# Patient Record
Sex: MALE | Race: WHITE | Employment: FULL TIME
[De-identification: names, ages, dates, MRNs, and addresses within clinical notes are randomized per-mention and may not be internally consistent; named-entity substitution may affect disease eponyms.]

---

## 2022-08-02 ENCOUNTER — NURSE TRIAGE (OUTPATIENT)
Dept: OTHER | Facility: CLINIC | Age: 42
End: 2022-08-02

## 2022-08-02 NOTE — TELEPHONE ENCOUNTER
Received call from [facility] with Red Flag Complaint. Subjective: Caller states \"nausea and vomiting , bloody stools in last month put has been seen for this a few months ago\"     Current Symptoms: nausea, vomiting, no appetite    Onset: 1 week ago; gradual, worsening    Associated Symptoms: reduced appetite, diarrhea    Pain Severity: 0/10; N/A;     Temperature: denies    What has been tried: denies    LMP: NA Pregnant: NA    Recommended disposition: See in Office Today or Tomorrow    Care advice provided, patient verbalizes understanding; denies any other questions or concerns; instructed to call back for any new or worsening symptoms. Patient/Caller agrees with recommended disposition; writer provided warm transfer to [facility] for appointment scheduling    Attention Provider: Thank you for allowing me to participate in the care of your patient. The patient was connected to triage in response to information provided to the [facility]. Please do not respond through this encounter as the response is not directed to a shared pool.       Reason for Disposition   Patient wants to be seen    Protocols used: Nausea-ADULT-OH

## 2022-08-03 ENCOUNTER — OFFICE VISIT (OUTPATIENT)
Dept: FAMILY MEDICINE CLINIC | Facility: CLINIC | Age: 42
End: 2022-08-03
Payer: COMMERCIAL

## 2022-08-03 VITALS
OXYGEN SATURATION: 98 % | WEIGHT: 233.8 LBS | DIASTOLIC BLOOD PRESSURE: 90 MMHG | TEMPERATURE: 98.7 F | RESPIRATION RATE: 18 BRPM | HEART RATE: 81 BPM | HEIGHT: 75 IN | SYSTOLIC BLOOD PRESSURE: 140 MMHG | BODY MASS INDEX: 29.07 KG/M2

## 2022-08-03 DIAGNOSIS — K58.0 IRRITABLE BOWEL SYNDROME WITH DIARRHEA: ICD-10-CM

## 2022-08-03 DIAGNOSIS — F10.10 ALCOHOL ABUSE, DAILY USE: ICD-10-CM

## 2022-08-03 DIAGNOSIS — R11.0 NAUSEA: ICD-10-CM

## 2022-08-03 DIAGNOSIS — I10 ESSENTIAL HYPERTENSION: Primary | ICD-10-CM

## 2022-08-03 DIAGNOSIS — E78.5 DYSLIPIDEMIA: ICD-10-CM

## 2022-08-03 LAB
AMYLASE SERPL-CCNC: 39 U/L (ref 25–115)
LIPASE SERPL-CCNC: 124 U/L (ref 73–393)

## 2022-08-03 PROCEDURE — 99204 OFFICE O/P NEW MOD 45 MIN: CPT | Performed by: PHYSICIAN ASSISTANT

## 2022-08-03 RX ORDER — DEXTROAMPHETAMINE SACCHARATE, AMPHETAMINE ASPARTATE MONOHYDRATE, DEXTROAMPHETAMINE SULFATE AND AMPHETAMINE SULFATE 2.5; 2.5; 2.5; 2.5 MG/1; MG/1; MG/1; MG/1
10 CAPSULE, EXTENDED RELEASE ORAL
COMMUNITY
Start: 2022-07-18

## 2022-08-03 RX ORDER — ATORVASTATIN CALCIUM 10 MG/1
10 TABLET, FILM COATED ORAL DAILY
COMMUNITY
End: 2022-08-03

## 2022-08-03 RX ORDER — LISINOPRIL 10 MG/1
10 TABLET ORAL DAILY
COMMUNITY
Start: 2022-07-08 | End: 2022-08-03 | Stop reason: SDUPTHER

## 2022-08-03 RX ORDER — DEXTROAMPHETAMINE SACCHARATE, AMPHETAMINE ASPARTATE, DEXTROAMPHETAMINE SULFATE AND AMPHETAMINE SULFATE 2.5; 2.5; 2.5; 2.5 MG/1; MG/1; MG/1; MG/1
10 TABLET ORAL
COMMUNITY
Start: 2022-07-18

## 2022-08-03 RX ORDER — LISINOPRIL 10 MG/1
10 TABLET ORAL DAILY
Qty: 90 TABLET | Refills: 3 | Status: SHIPPED | OUTPATIENT
Start: 2022-08-03

## 2022-08-03 RX ORDER — FAMOTIDINE 20 MG/1
TABLET, FILM COATED ORAL
Qty: 90 TABLET | Refills: 3 | Status: SHIPPED | OUTPATIENT
Start: 2022-08-03

## 2022-08-03 RX ORDER — ATORVASTATIN CALCIUM 10 MG/1
10 TABLET, FILM COATED ORAL DAILY
Qty: 90 TABLET | Refills: 3 | Status: SHIPPED | OUTPATIENT
Start: 2022-08-03

## 2022-08-03 RX ORDER — MELOXICAM 15 MG/1
15 TABLET ORAL DAILY
COMMUNITY
Start: 2022-07-18

## 2022-08-03 RX ORDER — ONDANSETRON 4 MG/1
4 TABLET, FILM COATED ORAL 3 TIMES DAILY PRN
Qty: 30 TABLET | Refills: 2 | Status: SHIPPED | OUTPATIENT
Start: 2022-08-03

## 2022-08-03 ASSESSMENT — ANXIETY QUESTIONNAIRES
1. FEELING NERVOUS, ANXIOUS, OR ON EDGE: 0
7. FEELING AFRAID AS IF SOMETHING AWFUL MIGHT HAPPEN: 1
IF YOU CHECKED OFF ANY PROBLEMS ON THIS QUESTIONNAIRE, HOW DIFFICULT HAVE THESE PROBLEMS MADE IT FOR YOU TO DO YOUR WORK, TAKE CARE OF THINGS AT HOME, OR GET ALONG WITH OTHER PEOPLE: NOT DIFFICULT AT ALL
3. WORRYING TOO MUCH ABOUT DIFFERENT THINGS: 0
6. BECOMING EASILY ANNOYED OR IRRITABLE: 0
2. NOT BEING ABLE TO STOP OR CONTROL WORRYING: 0
4. TROUBLE RELAXING: 0
GAD7 TOTAL SCORE: 1
5. BEING SO RESTLESS THAT IT IS HARD TO SIT STILL: 0

## 2022-08-03 ASSESSMENT — PATIENT HEALTH QUESTIONNAIRE - PHQ9
SUM OF ALL RESPONSES TO PHQ QUESTIONS 1-9: 0
2. FEELING DOWN, DEPRESSED OR HOPELESS: 0
1. LITTLE INTEREST OR PLEASURE IN DOING THINGS: 0
SUM OF ALL RESPONSES TO PHQ9 QUESTIONS 1 & 2: 0

## 2022-08-03 ASSESSMENT — ENCOUNTER SYMPTOMS
WHEEZING: 0
SHORTNESS OF BREATH: 0
EYE PAIN: 0
DIARRHEA: 1
ABDOMINAL PAIN: 0
NAUSEA: 1
CHEST TIGHTNESS: 0
SORE THROAT: 0

## 2022-08-03 NOTE — PROGRESS NOTES
Normal rate and regular rhythm. Heart sounds: Normal heart sounds. Pulmonary:      Effort: Pulmonary effort is normal.      Breath sounds: Normal breath sounds. Abdominal:      General: Abdomen is flat. There is no distension. Palpations: Abdomen is soft. There is no mass. Tenderness: There is no abdominal tenderness. There is no guarding. Musculoskeletal:      Cervical back: Normal range of motion. Skin:     General: Skin is warm and dry. Neurological:      General: No focal deficit present. Mental Status: He is alert. Psychiatric:         Mood and Affect: Mood normal.         Behavior: Behavior normal.         Thought Content: Thought content normal.         Judgment: Judgment normal.       PHQ-9  8/3/2022   Little interest or pleasure in doing things 0   Feeling down, depressed, or hopeless 0   PHQ-2 Score 0   PHQ-9 Total Score 0          DIAGNOSIS  Aureliano Braga was seen today for new patient and nausea. Diagnoses and all orders for this visit:    Essential hypertension  -     lisinopril (PRINIVIL;ZESTRIL) 10 MG tablet; Take 1 tablet by mouth in the morning. Dyslipidemia  -     atorvastatin (LIPITOR) 10 MG tablet; Take 1 tablet by mouth in the morning. Irritable bowel syndrome with diarrhea    Nausea  -     ondansetron (ZOFRAN) 4 MG tablet; Take 1 tablet by mouth 3 times daily as needed for Nausea or Vomiting  -     Amylase; Future  -     Lipase; Future  -     Lipase  -     Amylase    Alcohol abuse, daily use    Other orders  -     famotidine (PEPCID) 20 MG tablet; 1 po daily x 2 weeks, then daily prn thereafter       Has been on lisinopril 10mg for past 5 mos or so. BP at today's visit a little increased--likely due to white coat HTN. BP's outside of office have been wnl.  120's/80's. No change to med. He is also taking Atorvastatin 10 mg daily. Taking meloxicam and working on exercises for left plantar fasciitis. Starts PT tomorrow.       Prescribed Adderall by [facility] in [facility]. Will have him start Famotidine daily x 2 weeks, then take daily on a prn basis after that. I provided him w/ a handout on a Low FODMAP diet and would like him to try this to see if it helps w/ frequenty IBS w/ diarrhea complaints. Prescribed Zofran to take prn for nausea. Will send to lab to check Lipase and Amylase levels today. He is normally drinking 5-6 beers/day. Encouraged him to reduce this to no more than 3 daily. Assessment & Plan  Pt was encouraged to sign up/go to [employer] for access to information at a later time. Follow Up    Please have him sign a release for prior medical records from [facility]--need copies of medical records and labs from past 1 year.    Please send to lab today for bloodwork  Return 4-6 weeks for re-eval of GI complaints      Orders Placed This Encounter   Procedures    Amylase     Standing Status:   Future     Number of Occurrences:   1     Standing Expiration Date:   8/3/2023    Lipase     Standing Status:   Future     Number of Occurrences:   1     Standing Expiration Date:   8/3/2023            [address] [address]  Dept: [phone]  Dept Fax: [phone]

## 2022-09-06 NOTE — PROGRESS NOTES
Nito Capps (: 1980) is a 38 y.o. male, an established patient, is here for evaluation of the following chief complaint(s):  Chief Complaint   Patient presents with    Gastroesophageal Reflux          ASSESSMENT/PLAN:  Chuy Vazquez was seen today for gastroesophageal reflux. Diagnoses and all orders for this visit:    Needs flu shot  -     Influenza, FLUCELVAX, (age 10 mo+), IM, PF, 0.5 mL  -     CO IMMUNIZ ADMIN,1 SINGLE/COMB VAC/TOXOID    Other orders  -     Tdap (ADACEL) 5-2-15.5 LF-MCG/0.5 injection; Inject 0.5 mLs into the muscle once for 1 dose      GI symptoms improved. Now only taking Famotidine prn (rarely needs). He did not need to try the FODMAP diet. Prescribed Adderall by [facility] in [facility]. We have not received prior medical records. He signed a release for these at his last visit. Will resend. Will schedule a return visit for a CPE in 6 mos and if prior medical records are not received by then, we'll obtain labs at his visit. Follow Up    Return for 6 mos CPE (no labs). SUBJECTIVE/OBJECTIVE:  At his visit On 8/3/22, the following was discussed:     Has been on lisinopril 10mg for past 5 mos or so. BP at today's visit a little increased--likely due to white coat HTN. BP's outside of office have been wnl.  120's/80's. No change to med. Taking meloxicam and working on exercises for left plantar fasciitis. Starts PT tomorrow. Will have him start Famotidine daily x 2 weeks, then take daily on a prn basis after that. I provided him w/ a handout on a Low FODMAP diet and would like him to try this to see if it helps w/ frequenty IBS w/ diarrhea complaints. --Prescribed Zofran to take prn for nausea. He is normally drinking 5-6 beers/day. Encouraged him to reduce this to no more than 3 daily. At today's visit:     Follow-up GI issues. Patient stated that he is feeling a lot better.                   Vitals:    22 1418   BP: 110/80   Pulse: 92 Resp: 18   Temp: 98.8 °F (37.1 °C)   TempSrc: Oral   SpO2: 98%   Weight: 234 lb (106.1 kg)   Height: 6' 2.5\" (1.892 m)                    Orders Placed This Encounter    MI IMMUNIZ ADMIN,1 SINGLE/COMB VAC/TOXOID    Influenza, FLUCELVAX, (age 10 mo+), IM, PF, 0.5 mL    Tdap (ADACEL) 5-2-15.5 LF-MCG/0.5 injection     Sig: Inject 0.5 mLs into the muscle once for 1 dose     Dispense:  0.5 mL     Refill:  0             An electronic signature was used to authenticate this note.   -- SHANA Dillon

## 2022-09-07 ENCOUNTER — OFFICE VISIT (OUTPATIENT)
Dept: FAMILY MEDICINE CLINIC | Facility: CLINIC | Age: 42
End: 2022-09-07
Payer: COMMERCIAL

## 2022-09-07 VITALS
OXYGEN SATURATION: 98 % | DIASTOLIC BLOOD PRESSURE: 80 MMHG | WEIGHT: 234 LBS | SYSTOLIC BLOOD PRESSURE: 110 MMHG | HEART RATE: 92 BPM | RESPIRATION RATE: 18 BRPM | HEIGHT: 75 IN | BODY MASS INDEX: 29.09 KG/M2 | TEMPERATURE: 98.8 F

## 2022-09-07 DIAGNOSIS — Z23 NEEDS FLU SHOT: Primary | ICD-10-CM

## 2022-09-07 PROCEDURE — 90471 IMMUNIZATION ADMIN: CPT | Performed by: PHYSICIAN ASSISTANT

## 2022-09-07 PROCEDURE — 99213 OFFICE O/P EST LOW 20 MIN: CPT | Performed by: PHYSICIAN ASSISTANT

## 2022-09-07 PROCEDURE — 90674 CCIIV4 VAC NO PRSV 0.5 ML IM: CPT | Performed by: PHYSICIAN ASSISTANT

## 2022-09-15 ENCOUNTER — TELEMEDICINE (OUTPATIENT)
Dept: FAMILY MEDICINE CLINIC | Facility: CLINIC | Age: 42
End: 2022-09-15
Payer: COMMERCIAL

## 2022-09-15 DIAGNOSIS — U07.1 COVID: Primary | ICD-10-CM

## 2022-09-15 PROCEDURE — 99214 OFFICE O/P EST MOD 30 MIN: CPT | Performed by: PHYSICIAN ASSISTANT

## 2022-09-15 NOTE — PROGRESS NOTES
Leidy Estrada  (1980) is an established patient, here for evaluation of the following:    Assessment & Plan   Below is the assessment and plan developed based on review of pertinent history, physical exam, labs, studies, and medications. Jessica Guzman was seen today for positive for covid-19. Diagnoses and all orders for this visit:    COVID  -     nirmatrelvir/ritonavir (PAXLOVID) 20 x 150 MG & 10 x 100MG TBPK; Take 3 tablets (two 150 mg nirmatrelvir and one 100 mg ritonavir tablets) by mouth every 12 hours for 5 days. Pt has covid. Has a ha, cough, sore throat, stuffy nose. Vomited a couple of times. Off/on fever. He is a candidate for paxlovid based on suggested criteria. Will have him hold Atorvastatin while taking Paxlovid and delay initial dose until 12 hrs after last dose of Atorvastatin. Reviewed quarantine recommendations w/ him. Follow Up    Return for F/u as scheduled/as needed. Subjective     Covid symptoms started Tuesday (9/13/22) night. Positive home test Wednesday 9/14/22) morning            Patient-Reported Vitals  Patient-Reported Vitals 9/15/2022   Patient-Reported Weight 230lb   Patient-Reported Height 6f2.5i           Orders Placed This Encounter    nirmatrelvir/ritonavir (PAXLOVID) 20 x 150 MG & 10 x 100MG TBPK     Sig: Take 3 tablets (two 150 mg nirmatrelvir and one 100 mg ritonavir tablets) by mouth every 12 hours for 5 days. Dispense:  30 tablet     Refill:  0     Order Specific Question:   Does this patient qualify for COVID-19 antIviral therapy based on criteria for treatment? Answer: Yes        Leidy Estrada, was evaluated through a synchronous (real-time) audio-video encounter. The patient (or guardian if applicable) is aware that this is a billable service, which includes applicable co-pays. This Virtual Visit was conducted with patient's (and/or legal guardian's) consent.  The visit was conducted pursuant to the emergency declaration under the [facility] [address]383 waiver authority and the [employer] General Act. Patient identification was verified, and a caregiver was present when appropriate. The patient was located at Home: [address] [place] [address]. Provider was located at [facility] (Appt Dept): [facility],  [address] [place] [address].              --SHANA Nichols

## 2023-04-05 ENCOUNTER — OFFICE VISIT (OUTPATIENT)
Dept: FAMILY MEDICINE CLINIC | Facility: CLINIC | Age: 43
End: 2023-04-05
Payer: COMMERCIAL

## 2023-04-05 VITALS
HEIGHT: 75 IN | SYSTOLIC BLOOD PRESSURE: 120 MMHG | DIASTOLIC BLOOD PRESSURE: 78 MMHG | BODY MASS INDEX: 25.24 KG/M2 | WEIGHT: 203 LBS

## 2023-04-05 DIAGNOSIS — Z72.0 OCCASIONAL CIGARETTE SMOKER: ICD-10-CM

## 2023-04-05 DIAGNOSIS — I10 ESSENTIAL HYPERTENSION: ICD-10-CM

## 2023-04-05 DIAGNOSIS — Z11.59 NEED FOR HEPATITIS C SCREENING TEST: ICD-10-CM

## 2023-04-05 DIAGNOSIS — E78.5 DYSLIPIDEMIA: ICD-10-CM

## 2023-04-05 DIAGNOSIS — Z00.00 PHYSICAL EXAM, ANNUAL: Primary | ICD-10-CM

## 2023-04-05 PROBLEM — S92.032A CLOSED DISPLACED AVULSION FRACTURE OF TUBEROSITY OF LEFT CALCANEUS: Status: ACTIVE | Noted: 2023-01-03

## 2023-04-05 LAB
BASOPHILS # BLD: 0.1 K/UL (ref 0–0.2)
BASOPHILS NFR BLD: 1 % (ref 0–2)
DIFFERENTIAL METHOD BLD: ABNORMAL
EOSINOPHIL # BLD: 0.1 K/UL (ref 0–0.8)
EOSINOPHIL NFR BLD: 1 % (ref 0.5–7.8)
ERYTHROCYTE [DISTWIDTH] IN BLOOD BY AUTOMATED COUNT: 13.1 % (ref 11.9–14.6)
HCT VFR BLD AUTO: 42.3 % (ref 41.1–50.3)
HGB BLD-MCNC: 14.2 G/DL (ref 13.6–17.2)
IMM GRANULOCYTES # BLD AUTO: 0 K/UL (ref 0–0.5)
IMM GRANULOCYTES NFR BLD AUTO: 0 % (ref 0–5)
LYMPHOCYTES # BLD: 1.6 K/UL (ref 0.5–4.6)
LYMPHOCYTES NFR BLD: 14 % (ref 13–44)
MCH RBC QN AUTO: 29.6 PG (ref 26.1–32.9)
MCHC RBC AUTO-ENTMCNC: 33.6 G/DL (ref 31.4–35)
MCV RBC AUTO: 88.3 FL (ref 82–102)
MONOCYTES # BLD: 0.9 K/UL (ref 0.1–1.3)
MONOCYTES NFR BLD: 8 % (ref 4–12)
NEUTS SEG # BLD: 8.3 K/UL (ref 1.7–8.2)
NEUTS SEG NFR BLD: 76 % (ref 43–78)
NRBC # BLD: 0 K/UL (ref 0–0.2)
PLATELET # BLD AUTO: 367 K/UL (ref 150–450)
PMV BLD AUTO: 10 FL (ref 9.4–12.3)
RBC # BLD AUTO: 4.79 M/UL (ref 4.23–5.6)
WBC # BLD AUTO: 11 K/UL (ref 4.3–11.1)

## 2023-04-05 PROCEDURE — 99396 PREV VISIT EST AGE 40-64: CPT | Performed by: PHYSICIAN ASSISTANT

## 2023-04-05 PROCEDURE — 3074F SYST BP LT 130 MM HG: CPT | Performed by: PHYSICIAN ASSISTANT

## 2023-04-05 PROCEDURE — 90471 IMMUNIZATION ADMIN: CPT | Performed by: PHYSICIAN ASSISTANT

## 2023-04-05 PROCEDURE — 3078F DIAST BP <80 MM HG: CPT | Performed by: PHYSICIAN ASSISTANT

## 2023-04-05 PROCEDURE — 90677 PCV20 VACCINE IM: CPT | Performed by: PHYSICIAN ASSISTANT

## 2023-04-05 RX ORDER — ATORVASTATIN CALCIUM 10 MG/1
10 TABLET, FILM COATED ORAL DAILY
Qty: 90 TABLET | Refills: 3 | Status: SHIPPED | OUTPATIENT
Start: 2023-04-05

## 2023-04-05 RX ORDER — LISINOPRIL 10 MG/1
10 TABLET ORAL DAILY
Qty: 90 TABLET | Refills: 3 | Status: SHIPPED | OUTPATIENT
Start: 2023-04-05

## 2023-04-05 SDOH — ECONOMIC STABILITY: INCOME INSECURITY: HOW HARD IS IT FOR YOU TO PAY FOR THE VERY BASICS LIKE FOOD, HOUSING, MEDICAL CARE, AND HEATING?: NOT HARD AT ALL

## 2023-04-05 SDOH — ECONOMIC STABILITY: FOOD INSECURITY: WITHIN THE PAST 12 MONTHS, YOU WORRIED THAT YOUR FOOD WOULD RUN OUT BEFORE YOU GOT MONEY TO BUY MORE.: NEVER TRUE

## 2023-04-05 SDOH — ECONOMIC STABILITY: HOUSING INSECURITY
IN THE LAST 12 MONTHS, WAS THERE A TIME WHEN YOU DID NOT HAVE A STEADY PLACE TO SLEEP OR SLEPT IN A SHELTER (INCLUDING NOW)?: NO

## 2023-04-05 SDOH — ECONOMIC STABILITY: FOOD INSECURITY: WITHIN THE PAST 12 MONTHS, THE FOOD YOU BOUGHT JUST DIDN'T LAST AND YOU DIDN'T HAVE MONEY TO GET MORE.: NEVER TRUE

## 2023-04-05 ASSESSMENT — PATIENT HEALTH QUESTIONNAIRE - PHQ9
SUM OF ALL RESPONSES TO PHQ9 QUESTIONS 1 & 2: 0
2. FEELING DOWN, DEPRESSED OR HOPELESS: 0
SUM OF ALL RESPONSES TO PHQ QUESTIONS 1-9: 0
1. LITTLE INTEREST OR PLEASURE IN DOING THINGS: 0
SUM OF ALL RESPONSES TO PHQ QUESTIONS 1-9: 0

## 2023-04-05 ASSESSMENT — ENCOUNTER SYMPTOMS
CONSTIPATION: 0
EYE ITCHING: 0
ABDOMINAL PAIN: 0
EYE PAIN: 0
DIARRHEA: 0
COUGH: 0
CHEST TIGHTNESS: 0
WHEEZING: 0
RHINORRHEA: 0
EYE DISCHARGE: 0
SORE THROAT: 0

## 2023-04-05 NOTE — PROGRESS NOTES
Rahel Ybarra (: 1980  ) is a 38 y.o. male  established patient, here for evaluation of the following chief complaint(s):  Chief Complaint   Patient presents with    Annual Exam     CPE          ASSESSMENT/PLAN:  Karla Mendenhall was seen today for annual exam.    Diagnoses and all orders for this visit:    Physical exam, annual  -     Pneumococcal, PCV20, PREVNAR 20, (age 25 yrs+), IM, PF  -     CBC with Auto Differential; Future  -     Comprehensive Metabolic Panel; Future  -     Lipid Panel; Future  -     TSH; Future    Essential hypertension  -     lisinopril (PRINIVIL;ZESTRIL) 10 MG tablet; Take 1 tablet by mouth daily    Dyslipidemia  -     atorvastatin (LIPITOR) 10 MG tablet; Take 1 tablet by mouth daily    Occasional cigarette smoker  -     Pneumococcal, PCV20, PREVNAR 20, (age 25 yrs+), IM, PF    Need for hepatitis C screening test  -     Hepatitis C Antibody; Future    Other orders  -     Tdap (ADACEL) 5-2-15.5 LF-MCG/0.5 injection; Inject 0.5 mLs into the muscle once for 1 dose        Continues on listed meds without difficulty. BP today is: WNL. Currently prescribed:   Key Anti-Hypertensive Meds            lisinopril (PRINIVIL;ZESTRIL) 10 MG tablet    Sig - Route: Take 1 tablet by mouth in the morning. - Oral    Notes to Pharmacy: Please place rx on file until pt. Requests refill. Last colonoscopy was in , due  again ? ?--will request copy of report. He was hospitalized on 1/3/23 for a closed displaced fx of left calcaneus. PHQ screening is not concerning for depression. Prescribed Adderall by [facility] in [facility]. Anticipatory Guidance discussed for the following: Family Problems, Diet & exercise, Substance abuse (none), sexual practices, injury prevention and dental health. Follow Up    Please have him sign a release for prior medical records from [facility]--need copy of last colonoscopy report.    6 mos --HTN mgt w/ labs prior to

## 2023-04-05 NOTE — PATIENT INSTRUCTIONS
Thank you for enrolling in [address] Please follow the instructions below to securely access your online medical record. [employer] allows you to send messages to your doctor, view your test results, renew your prescriptions, schedule appointments, and more. How Do I Sign Up? In your Internet browser, go to [email or web address]  Click on the Sign Up Now link in the Sign In box. You will see the New Member Sign Up page. Enter your [employer] Access Code exactly as it appears below. You will not need to use this code after youve completed the sign-up process. If you do not sign up before the expiration date, you must request a new code. [employer] Access Code: [email or web address]  Expires: 5/20/2023  2:26 PM    Enter your Social Security Number ([id number]) and Date of Birth (mm/dd/yyyy) as indicated and click Submit. You will be taken to the next sign-up page. Create a [employer] ID. This will be your [employer] login ID and cannot be changed, so think of one that is secure and easy to remember. Create a [employer] password. You can change your password at any time. Enter your Password Reset Question and Answer. This can be used at a later time if you forget your password. Enter your e-mail address. You will receive e-mail notification when new information is available in [address] Click Sign Up. You can now view your medical record. Additional Information  If you have questions, please contact your physician practice where you receive care. Remember, [employer] is NOT to be used for urgent needs. For medical emergencies, dial 911.

## 2023-04-05 NOTE — LETTER
MG/DL    Total Bilirubin 0.5 0.2 - 1.1 MG/DL    ALT 33 12 - 65 U/L    AST 17 15 - 37 U/L    Alk Phosphatase 64 50 - 136 U/L    Total Protein 7.4 6.3 - 8.2 g/dL    Albumin 4.4 3.5 - 5.0 g/dL    Globulin 3.0 2.8 - 4.5 g/dL    Albumin/Globulin Ratio 1.5 0.4 - 1.6     CBC with Auto Differential   Result Value Ref Range    WBC 11.0 4.3 - 11.1 K/uL    RBC 4.79 4.23 - 5.6 M/uL    Hemoglobin 14.2 13.6 - 17.2 g/dL    Hematocrit 42.3 41.1 - 50.3 %    MCV 88.3 82 - 102 FL    MCH 29.6 26.1 - 32.9 PG    MCHC 33.6 31.4 - 35.0 g/dL    RDW 13.1 11.9 - 14.6 %    Platelets 475 574 - 596 K/uL    MPV 10.0 9.4 - 12.3 FL    nRBC 0.00 0.0 - 0.2 K/uL      Comment:      **Note: Absolute NRBC parameter is now reported with Hemogram**    Differential Type AUTOMATED      Seg Neutrophils 76 43 - 78 %    Lymphocytes 14 13 - 44 %    Monocytes 8 4.0 - 12.0 %    Eosinophils % 1 0.5 - 7.8 %    Basophils 1 0.0 - 2.0 %    Immature Granulocytes 0 0.0 - 5.0 %    Segs Absolute 8.3 (H) 1.7 - 8.2 K/UL    Absolute Lymph # 1.6 0.5 - 4.6 K/UL    Absolute Mono # 0.9 0.1 - 1.3 K/UL    Absolute Eos # 0.1 0.0 - 0.8 K/UL    Basophils Absolute 0.1 0.0 - 0.2 K/UL    Absolute Immature Granulocyte 0.0 0.0 - 0.5 K/UL       If you have any questions or concerns, please don't hesitate to call.     Sincerely,        SHANA Jacome

## 2023-04-06 LAB
ALBUMIN SERPL-MCNC: 4.4 G/DL (ref 3.5–5)
ALBUMIN/GLOB SERPL: 1.5 (ref 0.4–1.6)
ALP SERPL-CCNC: 64 U/L (ref 50–136)
ALT SERPL-CCNC: 33 U/L (ref 12–65)
ANION GAP SERPL CALC-SCNC: 9 MMOL/L (ref 2–11)
AST SERPL-CCNC: 17 U/L (ref 15–37)
BILIRUB SERPL-MCNC: 0.5 MG/DL (ref 0.2–1.1)
BUN SERPL-MCNC: 18 MG/DL (ref 6–23)
CALCIUM SERPL-MCNC: 9.6 MG/DL (ref 8.3–10.4)
CHLORIDE SERPL-SCNC: 104 MMOL/L (ref 101–110)
CHOLEST SERPL-MCNC: 129 MG/DL
CO2 SERPL-SCNC: 23 MMOL/L (ref 21–32)
CREAT SERPL-MCNC: 1.1 MG/DL (ref 0.8–1.5)
GLOBULIN SER CALC-MCNC: 3 G/DL (ref 2.8–4.5)
GLUCOSE SERPL-MCNC: 85 MG/DL (ref 65–100)
HDLC SERPL-MCNC: 56 MG/DL (ref 40–60)
HDLC SERPL: 2.3
LDLC SERPL CALC-MCNC: 54.2 MG/DL
POTASSIUM SERPL-SCNC: 3.9 MMOL/L (ref 3.5–5.1)
PROT SERPL-MCNC: 7.4 G/DL (ref 6.3–8.2)
SODIUM SERPL-SCNC: 136 MMOL/L (ref 133–143)
TRIGL SERPL-MCNC: 94 MG/DL (ref 35–150)
TSH, 3RD GENERATION: 2.29 UIU/ML (ref 0.36–3.74)
VLDLC SERPL CALC-MCNC: 18.8 MG/DL (ref 6–23)

## 2023-04-09 LAB — HCV AB SER QL: NONREACTIVE

## 2023-08-18 ENCOUNTER — TELEPHONE (OUTPATIENT)
Dept: FAMILY MEDICINE CLINIC | Facility: CLINIC | Age: 43
End: 2023-08-18

## 2023-08-18 DIAGNOSIS — N52.9 ERECTILE DYSFUNCTION, UNSPECIFIED ERECTILE DYSFUNCTION TYPE: Primary | ICD-10-CM

## 2023-08-18 NOTE — TELEPHONE ENCOUNTER
Pt verbalized that he needs a script for Viagra.     Pharmacy [facility] [address] [facility] [address]

## 2023-08-21 RX ORDER — SILDENAFIL 50 MG/1
50 TABLET, FILM COATED ORAL PRN
Qty: 10 TABLET | Refills: 1 | Status: SHIPPED | OUTPATIENT
Start: 2023-08-21

## 2023-08-21 NOTE — TELEPHONE ENCOUNTER
I don't see that we've ever prescribed this for him previously. Will go ahead and send in an rx for 10 tabs + 1 RF and will discuss further at next appt in October.

## 2023-09-05 DIAGNOSIS — E78.5 DYSLIPIDEMIA: ICD-10-CM

## 2023-09-05 DIAGNOSIS — I10 ESSENTIAL HYPERTENSION: Primary | ICD-10-CM

## 2023-10-04 PROBLEM — Z87.81 HISTORY OF FOOT FRACTURE: Status: ACTIVE | Noted: 2023-01-03

## 2023-10-05 DIAGNOSIS — I10 ESSENTIAL HYPERTENSION: ICD-10-CM

## 2023-10-06 LAB
ANION GAP SERPL CALC-SCNC: 7 MMOL/L (ref 2–11)
BUN SERPL-MCNC: 14 MG/DL (ref 6–23)
CALCIUM SERPL-MCNC: 9.6 MG/DL (ref 8.3–10.4)
CHLORIDE SERPL-SCNC: 106 MMOL/L (ref 101–110)
CO2 SERPL-SCNC: 27 MMOL/L (ref 21–32)
CREAT SERPL-MCNC: 1.2 MG/DL (ref 0.8–1.5)
GLUCOSE SERPL-MCNC: 94 MG/DL (ref 65–100)
POTASSIUM SERPL-SCNC: 3.7 MMOL/L (ref 3.5–5.1)
SODIUM SERPL-SCNC: 140 MMOL/L (ref 133–143)

## 2023-10-09 ENCOUNTER — TELEPHONE (OUTPATIENT)
Dept: FAMILY MEDICINE CLINIC | Facility: CLINIC | Age: 43
End: 2023-10-09

## 2023-10-09 NOTE — TELEPHONE ENCOUNTER
----- Message from Kim Mercer sent at 10/6/2023  4:19 PM EDT -----  Pt missed appt w/ me  on  10/5/23. Please reschedule. Will review lab results at visit.

## 2023-10-11 ENCOUNTER — TELEPHONE (OUTPATIENT)
Dept: FAMILY MEDICINE CLINIC | Facility: CLINIC | Age: 43
End: 2023-10-11

## 2023-10-11 ENCOUNTER — TELEMEDICINE (OUTPATIENT)
Dept: FAMILY MEDICINE CLINIC | Facility: CLINIC | Age: 43
End: 2023-10-11
Payer: COMMERCIAL

## 2023-10-11 DIAGNOSIS — N52.9 ERECTILE DYSFUNCTION, UNSPECIFIED ERECTILE DYSFUNCTION TYPE: ICD-10-CM

## 2023-10-11 DIAGNOSIS — E78.5 DYSLIPIDEMIA: ICD-10-CM

## 2023-10-11 DIAGNOSIS — I10 ESSENTIAL HYPERTENSION: Primary | ICD-10-CM

## 2023-10-11 PROCEDURE — 99214 OFFICE O/P EST MOD 30 MIN: CPT | Performed by: PHYSICIAN ASSISTANT

## 2023-10-11 RX ORDER — SILDENAFIL 50 MG/1
50 TABLET, FILM COATED ORAL PRN
Qty: 10 TABLET | Refills: 5 | Status: SHIPPED | OUTPATIENT
Start: 2023-10-11

## 2023-10-11 NOTE — PROGRESS NOTES
Arnoldo Patterson (: 1980) is a 37 y.o. male, an established patient, is here for evaluation of the following chief complaint(s):  Chief Complaint   Patient presents with    Hypertension          ASSESSMENT/PLAN:  Hanna Orozco was seen today for hypertension. Diagnoses and all orders for this visit:    Essential hypertension    Dyslipidemia    Erectile dysfunction, unspecified erectile dysfunction type  -     sildenafil (VIAGRA) 50 MG tablet; Take 1 tablet by mouth as needed for Erectile Dysfunction    Other orders  -     Tdap (ADACEL) 5-2-15.5 LF-MCG/0.5 injection; Inject 0.5 mLs into the muscle once for 1 dose        BP today is: elevated. Currently prescribed:    lisinopril (PRINIVIL;ZESTRIL) 10 MG tablet    Sig - Route: Take 1 tablet by mouth daily - Oral    Notes to Pharmacy: Please place rx on file until pt. Requests refill. No HA, CP or blurry vision. Pt reports that he's had a lot of caffeine today. Will d/c atorvastatin and recheck lipids w/ next visit. Refilled Viagra for ED. He wishes to postpone colonoscopy screening--needs to coordinate w/ his mother. Follow Up    No follow-ups on file. SUBJECTIVE/OBJECTIVE:  HPI    At his visit On 23, the following was discussed:     Last colonoscopy was in , due  again ? ?--will request copy of report. At today's visit:     He has no new concerns.            10/11/2023     1:01 PM   Patient-Reported Vitals   Patient-Reported Weight 203lb   Patient-Reported Height 6f2.5i   Patient-Reported Systolic 562 mmHg   Patient-Reported Diastolic 86 mmHg          Orders Placed This Encounter    Tdap (ADACEL) 5-2-15.5 LF-MCG/0.5 injection     Sig: Inject 0.5 mLs into the muscle once for 1 dose     Dispense:  0.5 mL     Refill:  0    sildenafil (VIAGRA) 50 MG tablet     Sig: Take 1 tablet by mouth as needed for Erectile Dysfunction     Dispense:  10 tablet     Refill:  [address], was evaluated through a synchronous (real-time)

## 2023-10-11 NOTE — TELEPHONE ENCOUNTER
----- Message from Kim Marin sent at 10/6/2023  4:19 PM EDT -----  Pt missed appt w/ me  on  10/5/23. Please reschedule. Will review lab results at visit.

## 2023-10-24 DIAGNOSIS — N52.9 ERECTILE DYSFUNCTION, UNSPECIFIED ERECTILE DYSFUNCTION TYPE: ICD-10-CM

## 2023-10-24 RX ORDER — SILDENAFIL 50 MG/1
50 TABLET, FILM COATED ORAL PRN
Qty: 10 TABLET | Refills: 5 | Status: CANCELLED | OUTPATIENT
Start: 2023-10-24

## 2023-10-24 NOTE — TELEPHONE ENCOUNTER
PT IS REQUESTING A REFILL FOR SILDENAFIL 50 MG    PH[facility]Y [facility] [place], [facility]  [address] [facility]

## 2023-10-24 NOTE — TELEPHONE ENCOUNTER
Rx was sent to [facility] on 10/11/23 for 10 tablets (1mo supply) + 5 RF (for a total of 6 mos)

## 2024-02-28 DIAGNOSIS — E78.5 DYSLIPIDEMIA: ICD-10-CM

## 2024-02-28 DIAGNOSIS — I10 ESSENTIAL HYPERTENSION: Primary | ICD-10-CM

## 2024-03-29 ENCOUNTER — NURSE ONLY (OUTPATIENT)
Dept: FAMILY MEDICINE CLINIC | Facility: CLINIC | Age: 44
End: 2024-03-29

## 2024-03-29 DIAGNOSIS — E78.5 DYSLIPIDEMIA: ICD-10-CM

## 2024-03-29 DIAGNOSIS — I10 ESSENTIAL HYPERTENSION: ICD-10-CM

## 2024-03-29 LAB
ALBUMIN SERPL-MCNC: 4 G/DL (ref 3.5–5)
ALBUMIN/GLOB SERPL: 1.2 (ref 0.4–1.6)
ALP SERPL-CCNC: 48 U/L (ref 50–136)
ALT SERPL-CCNC: 31 U/L (ref 12–65)
ANION GAP SERPL CALC-SCNC: 5 MMOL/L (ref 2–11)
AST SERPL-CCNC: 20 U/L (ref 15–37)
BILIRUB SERPL-MCNC: 0.5 MG/DL (ref 0.2–1.1)
BUN SERPL-MCNC: 11 MG/DL (ref 6–23)
CALCIUM SERPL-MCNC: 9.4 MG/DL (ref 8.3–10.4)
CHLORIDE SERPL-SCNC: 111 MMOL/L (ref 103–113)
CHOLEST SERPL-MCNC: 153 MG/DL
CO2 SERPL-SCNC: 24 MMOL/L (ref 21–32)
CREAT SERPL-MCNC: 1.3 MG/DL (ref 0.8–1.5)
GLOBULIN SER CALC-MCNC: 3.3 G/DL (ref 2.8–4.5)
GLUCOSE SERPL-MCNC: 103 MG/DL (ref 65–100)
HDLC SERPL-MCNC: 51 MG/DL (ref 40–60)
HDLC SERPL: 3
LDLC SERPL CALC-MCNC: 80.8 MG/DL
POTASSIUM SERPL-SCNC: 4 MMOL/L (ref 3.5–5.1)
PROT SERPL-MCNC: 7.3 G/DL (ref 6.3–8.2)
SODIUM SERPL-SCNC: 140 MMOL/L (ref 136–146)
TRIGL SERPL-MCNC: 106 MG/DL (ref 35–150)
VLDLC SERPL CALC-MCNC: 21.2 MG/DL (ref 6–23)

## 2024-04-10 NOTE — PROGRESS NOTES
SHANA Moise     Part of this note was written by using a voice dictation software. The note has been proof read but may still contain some grammatical/other typographical errors.

## 2024-04-11 ENCOUNTER — OFFICE VISIT (OUTPATIENT)
Dept: FAMILY MEDICINE CLINIC | Facility: CLINIC | Age: 44
End: 2024-04-11
Payer: COMMERCIAL

## 2024-04-11 VITALS
BODY MASS INDEX: 25.99 KG/M2 | SYSTOLIC BLOOD PRESSURE: 104 MMHG | OXYGEN SATURATION: 98 % | TEMPERATURE: 98.2 F | WEIGHT: 209 LBS | DIASTOLIC BLOOD PRESSURE: 68 MMHG | HEIGHT: 75 IN | RESPIRATION RATE: 16 BRPM | HEART RATE: 65 BPM

## 2024-04-11 DIAGNOSIS — Z12.11 SCREENING FOR COLORECTAL CANCER: ICD-10-CM

## 2024-04-11 DIAGNOSIS — I10 PRIMARY HYPERTENSION: Primary | ICD-10-CM

## 2024-04-11 DIAGNOSIS — Z12.12 SCREENING FOR COLORECTAL CANCER: ICD-10-CM

## 2024-04-11 DIAGNOSIS — Z00.00 BLOOD TESTS FOR ROUTINE GENERAL PHYSICAL EXAMINATION: ICD-10-CM

## 2024-04-11 DIAGNOSIS — I10 ESSENTIAL HYPERTENSION: ICD-10-CM

## 2024-04-11 DIAGNOSIS — N52.9 ERECTILE DYSFUNCTION, UNSPECIFIED ERECTILE DYSFUNCTION TYPE: ICD-10-CM

## 2024-04-11 DIAGNOSIS — E78.5 DYSLIPIDEMIA: ICD-10-CM

## 2024-04-11 PROBLEM — Z86.79 HISTORY OF HYPERTENSION: Status: ACTIVE | Noted: 2022-08-03

## 2024-04-11 PROBLEM — Z86.39 HISTORY OF HYPERLIPIDEMIA: Status: ACTIVE | Noted: 2022-08-03

## 2024-04-11 PROCEDURE — 99214 OFFICE O/P EST MOD 30 MIN: CPT | Performed by: PHYSICIAN ASSISTANT

## 2024-04-11 PROCEDURE — 3078F DIAST BP <80 MM HG: CPT | Performed by: PHYSICIAN ASSISTANT

## 2024-04-11 PROCEDURE — 3074F SYST BP LT 130 MM HG: CPT | Performed by: PHYSICIAN ASSISTANT

## 2024-04-11 RX ORDER — LISINOPRIL 10 MG/1
10 TABLET ORAL DAILY
Qty: 90 TABLET | Refills: 3 | OUTPATIENT
Start: 2024-04-11

## 2024-04-11 RX ORDER — SILDENAFIL 50 MG/1
50 TABLET, FILM COATED ORAL PRN
Qty: 30 TABLET | Refills: 3 | Status: SHIPPED | OUTPATIENT
Start: 2024-04-11

## 2024-04-11 RX ORDER — AMPHETAMINE 6.3 MG/1
TABLET, ORALLY DISINTEGRATING ORAL
COMMUNITY

## 2024-04-11 SDOH — ECONOMIC STABILITY: FOOD INSECURITY: WITHIN THE PAST 12 MONTHS, THE FOOD YOU BOUGHT JUST DIDN'T LAST AND YOU DIDN'T HAVE MONEY TO GET MORE.: NEVER TRUE

## 2024-04-11 SDOH — ECONOMIC STABILITY: FOOD INSECURITY: WITHIN THE PAST 12 MONTHS, YOU WORRIED THAT YOUR FOOD WOULD RUN OUT BEFORE YOU GOT MONEY TO BUY MORE.: NEVER TRUE

## 2024-04-11 SDOH — ECONOMIC STABILITY: INCOME INSECURITY: HOW HARD IS IT FOR YOU TO PAY FOR THE VERY BASICS LIKE FOOD, HOUSING, MEDICAL CARE, AND HEATING?: NOT HARD AT ALL

## 2024-04-11 ASSESSMENT — PATIENT HEALTH QUESTIONNAIRE - PHQ9
SUM OF ALL RESPONSES TO PHQ QUESTIONS 1-9: 0
SUM OF ALL RESPONSES TO PHQ QUESTIONS 1-9: 0
1. LITTLE INTEREST OR PLEASURE IN DOING THINGS: NOT AT ALL
SUM OF ALL RESPONSES TO PHQ QUESTIONS 1-9: 0
SUM OF ALL RESPONSES TO PHQ QUESTIONS 1-9: 0
SUM OF ALL RESPONSES TO PHQ9 QUESTIONS 1 & 2: 0
2. FEELING DOWN, DEPRESSED OR HOPELESS: NOT AT ALL

## 2024-04-11 NOTE — TELEPHONE ENCOUNTER
[facility] requesting Lisinopril 10 mg. Last filled on 4/5/23 with 90 and 3 refills.      Patient is  a SHANA Sparrow patient who requests that all of her patient's call for refills.

## 2024-06-25 DIAGNOSIS — N52.9 ERECTILE DYSFUNCTION, UNSPECIFIED ERECTILE DYSFUNCTION TYPE: ICD-10-CM

## 2024-06-26 RX ORDER — SILDENAFIL 50 MG/1
50 TABLET, FILM COATED ORAL PRN
Qty: 10 TABLET | Refills: 5 | OUTPATIENT
Start: 2024-06-26

## 2024-07-26 ENCOUNTER — TELEPHONE (OUTPATIENT)
Age: 44
End: 2024-07-26

## 2024-07-26 ENCOUNTER — PREP FOR PROCEDURE (OUTPATIENT)
Age: 44
End: 2024-07-26

## 2024-07-26 DIAGNOSIS — Z12.11 ENCOUNTER FOR SCREENING COLONOSCOPY: ICD-10-CM

## 2024-07-26 NOTE — TELEPHONE ENCOUNTER
Called and spoke with the patient to schedule a direct colonoscopy, scheduled 8/28/2024 with Dr Brandt at [facility]. Prep instructions mailed to the patient's home address.     GASTROENTEROLOGY  MIRALAX/GATORADE PREP          You are scheduled for a colonoscopy with Dr. Brandt on 8/28/2024    Items to purchase at pharmacy 5 days before your procedure:  Dulcolax tablets-- (LAXATIVE ONLY- not stool softener)  238-gram bottle of MiraLAX  64 ounces Gatorade, Crystal Light, Apple juice (NO RED BLUE OR PURPLE IN COLOR)    The day before your procedure:  Clear liquids only the entire day (water, coffee, tea, Sprite, 7-up, Jell-O, popsicles, Chicken/beef broth, apple juice, Gatorade) NO MILK, CREAMER, OR DAIRY PRODUCTS. NO SOLID FOODS. NOTHING RED, BLUE, OR PURPLE IN COLOR.  Mix Gatorade and MiraLAX together, shake the solution until the MiraLAX is dissolved. Chill before drinking.   Start at 5:00 pm Start drinking MiraLAX solution. Drink 8-ounce glass every 30 minutes. Until half gone. Put remainder in refrigerator.  At 5:00 pm take 2x Dulcolax tablets with 16 oz of water    NO MORE LIQUIDS AFTER MIDNIGHT (other than 2nd part of MiraLAX solution)    The day of your procedure:     6 HOURS PRIOR TO PROCEDURE: Drink the other half of your MiraLAX solution until gone.     Once you finish your prep, no more liquids until after procedure.     IMPORTANT:     If you do not follow these instructions, your colonoscopy could be canceled due to having an unclean prep.     If you have problems with your prep, call [phone]    IMPORTANT: IF YOU DO NOT FOLLOW THESE DIRECTIONS, YOUR COLONOSCOPY COULD BE CANCELED DUE TO HAVING AN UNCLEAR PREP**    GETTING READY FOR YOUR COLONOSCOPY  You will need to arrive at:       [facility]  .[address] [place] [id number] [phone]        You will need to  your bowel prep from your local pharmacy.    You must arrange for someone 18 years or older to come with you

## 2024-07-30 RX ORDER — SODIUM CHLORIDE 9 MG/ML
25 INJECTION, SOLUTION INTRAVENOUS PRN
Status: CANCELLED | OUTPATIENT
Start: 2024-07-30

## 2024-07-30 RX ORDER — SODIUM CHLORIDE 0.9 % (FLUSH) 0.9 %
5-40 SYRINGE (ML) INJECTION PRN
Status: CANCELLED | OUTPATIENT
Start: 2024-07-30

## 2024-07-30 RX ORDER — SODIUM CHLORIDE 0.9 % (FLUSH) 0.9 %
5-40 SYRINGE (ML) INJECTION EVERY 12 HOURS SCHEDULED
Status: CANCELLED | OUTPATIENT
Start: 2024-07-30

## 2024-08-25 PROBLEM — Z12.11 ENCOUNTER FOR SCREENING COLONOSCOPY: Status: RESOLVED | Noted: 2024-07-26 | Resolved: 2024-08-25

## 2024-08-28 NOTE — PROGRESS NOTES
Patient verified name, , and procedure.    Type: 1A; abbreviated assessment per anesthesia guidelines.    Labs per anesthesia: none.    Instructed pt that they will be notified the day before their procedure by the GI Lab for time of arrival if their procedure is [place] and Pre-op for [place] cases. Arrival times should be called by 5 pm. If no phone is received the patient should contact their respective hospital. The GI lab telephone number is [phone] and ES Pre-op is [phone].     Follow diet and prep instructions per office, including NPO status.      Bath or shower the night before and the am of surgery with non-moisturizing soap. No lotions, oils, powders, perfumes, or cologne on skin. No make up, eye make up or jewelry. Wear loose fitting comfortable, clean clothing.     Must have adult present in building the entire time .       Patient instructed to hold all vitamins/supplements 7 days prior to surgery and NSAIDS 5 days prior to surgery. Verbalized understanding.     The following discharge instructions reviewed with patient: medication given during procedure may cause drowsiness for several hours, therefore, do not drive or operate machinery for remainder of the day. You may not drink alcohol on the day of your procedure, please resume regular diet and activity unless otherwise directed. You may experience abdominal distention for several hours that is relieved by the passage of gas. Contact your physician if you have any of the following: fever or chills, severe abdominal pain or excessive amount of bleeding or a large amount when having a bowel movement. Occasional specks of blood with bowel movement would not be unusual.    2% lidocaine/with EPI

## 2024-09-05 ENCOUNTER — ANESTHESIA EVENT (OUTPATIENT)
Dept: ENDOSCOPY | Age: 44
End: 2024-09-05
Payer: COMMERCIAL

## 2024-09-05 RX ORDER — NALOXONE HYDROCHLORIDE 0.4 MG/ML
INJECTION, SOLUTION INTRAMUSCULAR; INTRAVENOUS; SUBCUTANEOUS PRN
Status: CANCELLED | OUTPATIENT
Start: 2024-09-05

## 2024-09-05 RX ORDER — IPRATROPIUM BROMIDE AND ALBUTEROL SULFATE 2.5; .5 MG/3ML; MG/3ML
1 SOLUTION RESPIRATORY (INHALATION)
Status: CANCELLED | OUTPATIENT
Start: 2024-09-05 | End: 2024-09-06

## 2024-09-05 RX ORDER — ONDANSETRON 2 MG/ML
4 INJECTION INTRAMUSCULAR; INTRAVENOUS
Status: CANCELLED | OUTPATIENT
Start: 2024-09-05 | End: 2024-09-06

## 2024-09-05 NOTE — PROGRESS NOTES
Spoke with patient. Confirmed arrival time of 0730 for their 0859 procedure. Discussed NPO status after midnight. Reviewed  policy and advised patient to register in the lobby prior to coming to the GI lab. Patient verbalized understanding.

## 2024-09-06 ENCOUNTER — HOSPITAL ENCOUNTER (OUTPATIENT)
Age: 44
Setting detail: OUTPATIENT SURGERY
Discharge: HOME | End: 2024-09-06
Attending: STUDENT IN AN ORGANIZED HEALTH CARE EDUCATION/TRAINING PROGRAM | Admitting: STUDENT IN AN ORGANIZED HEALTH CARE EDUCATION/TRAINING PROGRAM
Payer: COMMERCIAL

## 2024-09-06 ENCOUNTER — ANESTHESIA (OUTPATIENT)
Dept: ENDOSCOPY | Age: 44
End: 2024-09-06
Payer: COMMERCIAL

## 2024-09-06 VITALS
BODY MASS INDEX: 26.73 KG/M2 | OXYGEN SATURATION: 100 % | DIASTOLIC BLOOD PRESSURE: 82 MMHG | WEIGHT: 215 LBS | HEART RATE: 78 BPM | HEIGHT: 75 IN | TEMPERATURE: 98.6 F | RESPIRATION RATE: 15 BRPM | SYSTOLIC BLOOD PRESSURE: 126 MMHG

## 2024-09-06 PROCEDURE — 7100000011 HC PHASE II RECOVERY - ADDTL 15 MIN: Performed by: STUDENT IN AN ORGANIZED HEALTH CARE EDUCATION/TRAINING PROGRAM

## 2024-09-06 PROCEDURE — 88305 TISSUE EXAM BY PATHOLOGIST: CPT

## 2024-09-06 PROCEDURE — 3609010600 HC COLONOSCOPY POLYPECTOMY SNARE/COLD BIOPSY: Performed by: STUDENT IN AN ORGANIZED HEALTH CARE EDUCATION/TRAINING PROGRAM

## 2024-09-06 PROCEDURE — 2500000003 HC RX 250 WO HCPCS: Performed by: NURSE ANESTHETIST, CERTIFIED REGISTERED

## 2024-09-06 PROCEDURE — 3700000000 HC ANESTHESIA ATTENDED CARE: Performed by: STUDENT IN AN ORGANIZED HEALTH CARE EDUCATION/TRAINING PROGRAM

## 2024-09-06 PROCEDURE — 7100000010 HC PHASE II RECOVERY - FIRST 15 MIN: Performed by: STUDENT IN AN ORGANIZED HEALTH CARE EDUCATION/TRAINING PROGRAM

## 2024-09-06 PROCEDURE — 3700000001 HC ADD 15 MINUTES (ANESTHESIA): Performed by: STUDENT IN AN ORGANIZED HEALTH CARE EDUCATION/TRAINING PROGRAM

## 2024-09-06 PROCEDURE — 6360000002 HC RX W HCPCS: Performed by: NURSE ANESTHETIST, CERTIFIED REGISTERED

## 2024-09-06 PROCEDURE — 2580000003 HC RX 258: Performed by: SURGERY

## 2024-09-06 PROCEDURE — 2709999900 HC NON-CHARGEABLE SUPPLY: Performed by: STUDENT IN AN ORGANIZED HEALTH CARE EDUCATION/TRAINING PROGRAM

## 2024-09-06 RX ORDER — SODIUM CHLORIDE 9 MG/ML
25 INJECTION, SOLUTION INTRAVENOUS PRN
Status: DISCONTINUED | OUTPATIENT
Start: 2024-09-06 | End: 2024-09-06 | Stop reason: HOSPADM

## 2024-09-06 RX ORDER — LIDOCAINE HYDROCHLORIDE 20 MG/ML
INJECTION, SOLUTION EPIDURAL; INFILTRATION; INTRACAUDAL; PERINEURAL PRN
Status: DISCONTINUED | OUTPATIENT
Start: 2024-09-06 | End: 2024-09-06 | Stop reason: SDUPTHER

## 2024-09-06 RX ORDER — SODIUM CHLORIDE 0.9 % (FLUSH) 0.9 %
5-40 SYRINGE (ML) INJECTION PRN
Status: DISCONTINUED | OUTPATIENT
Start: 2024-09-06 | End: 2024-09-06 | Stop reason: HOSPADM

## 2024-09-06 RX ORDER — LIDOCAINE HYDROCHLORIDE 10 MG/ML
1 INJECTION, SOLUTION INFILTRATION; PERINEURAL
Status: DISCONTINUED | OUTPATIENT
Start: 2024-09-06 | End: 2024-09-06 | Stop reason: HOSPADM

## 2024-09-06 RX ORDER — SODIUM CHLORIDE 9 MG/ML
INJECTION, SOLUTION INTRAVENOUS PRN
Status: DISCONTINUED | OUTPATIENT
Start: 2024-09-06 | End: 2024-09-06 | Stop reason: HOSPADM

## 2024-09-06 RX ORDER — SODIUM CHLORIDE 0.9 % (FLUSH) 0.9 %
5-40 SYRINGE (ML) INJECTION EVERY 12 HOURS SCHEDULED
Status: DISCONTINUED | OUTPATIENT
Start: 2024-09-06 | End: 2024-09-06 | Stop reason: HOSPADM

## 2024-09-06 RX ORDER — PROPOFOL 10 MG/ML
INJECTION, EMULSION INTRAVENOUS PRN
Status: DISCONTINUED | OUTPATIENT
Start: 2024-09-06 | End: 2024-09-06 | Stop reason: SDUPTHER

## 2024-09-06 RX ORDER — SODIUM CHLORIDE, SODIUM LACTATE, POTASSIUM CHLORIDE, CALCIUM CHLORIDE 600; 310; 30; 20 MG/100ML; MG/100ML; MG/100ML; MG/100ML
INJECTION, SOLUTION INTRAVENOUS CONTINUOUS
Status: DISCONTINUED | OUTPATIENT
Start: 2024-09-06 | End: 2024-09-06 | Stop reason: HOSPADM

## 2024-09-06 RX ADMIN — PROPOFOL 40 MG: 10 INJECTION, EMULSION INTRAVENOUS at 09:12

## 2024-09-06 RX ADMIN — PROPOFOL 300 MCG/KG/MIN: 10 INJECTION, EMULSION INTRAVENOUS at 09:11

## 2024-09-06 RX ADMIN — LIDOCAINE HYDROCHLORIDE 40 MG: 20 INJECTION, SOLUTION EPIDURAL; INFILTRATION; INTRACAUDAL; PERINEURAL at 09:10

## 2024-09-06 RX ADMIN — SODIUM CHLORIDE, POTASSIUM CHLORIDE, SODIUM LACTATE AND CALCIUM CHLORIDE: 600; 310; 30; 20 INJECTION, SOLUTION INTRAVENOUS at 07:53

## 2024-09-06 RX ADMIN — PROPOFOL 60 MG: 10 INJECTION, EMULSION INTRAVENOUS at 09:10

## 2024-09-06 ASSESSMENT — LIFESTYLE VARIABLES: SMOKING_STATUS: 1

## 2024-09-06 ASSESSMENT — PAIN - FUNCTIONAL ASSESSMENT: PAIN_FUNCTIONAL_ASSESSMENT: NONE - DENIES PAIN

## 2024-09-06 NOTE — ANESTHESIA POSTPROCEDURE EVALUATION
Department of Anesthesiology  Postprocedure Note    Patient: Samuel Hernandez  MRN: [id number]  YOB: 1980  Date of evaluation: 9/6/2024    Procedure Summary       Date: 09/06/24 Room / Location: [facility] ENDO  / [facility] ENDOSCOPY    Anesthesia Start: 0906 Anesthesia Stop: 0926    Procedure: COLONOSCOPY POLYPECTOMY SNARE/BIOPSY (Lower GI Region) Diagnosis:       Encounter for screening colonoscopy      (Encounter for screening colonoscopy [Z12.11])    Surgeons: Adrián Brandt MD Responsible Provider: All Siddiqui MD    Anesthesia Type: TIVA ASA Status: 2            Anesthesia Type: TIVA    Damien Phase I: Damien Score: 10    Damien Phase II: Damien Score: 10    Anesthesia Post Evaluation    Patient location during evaluation: PACU  Patient participation: complete - patient participated  Level of consciousness: awake and alert  Airway patency: patent  Nausea & Vomiting: no nausea and no vomiting  Cardiovascular status: hemodynamically stable  Respiratory status: acceptable, nonlabored ventilation and spontaneous ventilation  Hydration status: euvolemic  Comments: /82   Pulse 78   Temp 98.6 °F (37 °C) (Tympanic)   Resp 15   Ht 1.905 m (6' 3\")   Wt 97.5 kg (215 lb)   SpO2 100%   BMI 26.87 kg/m²     Multimodal analgesia pain management approach  Pain management: adequate and satisfactory to patient    No notable events documented.

## 2024-09-06 NOTE — DISCHARGE INSTRUCTIONS
Gastrointestinal Colonoscopy/Flexible Sigmoidoscopy - Lower Exam Discharge Instructions  Call Dr. Brandt at  for any problems or questions.  Contact the doctor’s office for follow up appointment as directed  Medication may cause drowsiness for several hours, therefore, do not drive or operate machinery for remainder of the day.  No alcohol today.  Do not make any important decisions such signing legal paperwork.  Ordinarily, you may resume regular diet and activity after exam unless otherwise specified by your physician.  Because of air put into your colon during exam, you may experience some abdominal distension, relieved by the passage of gas, for several hours.  Contact your physician if you have any of the following:  Excessive amount of bleeding - large amount when having a bowel movement.  Occasional specks of blood with bowel movement would not be unusual.  Severe abdominal pain  Fever or Chills  Polyp Removal - follow these additional instructions  Take Metamucil - 1 tablespoon in juice every morning for 3 days, if needed.  No Aspirin, Advil, Aleve, Nuprin, Ibuprofen, or medications that contain these drugs for 2 weeks, unless taken for a heart condition.    Any additional instructions:   ***        Instructions given to Samuel Hernandez and other family members.

## 2024-09-06 NOTE — OP NOTE
Operative Report    Patient: Samuel Hernandez MRN: [id number]      YOB: 1980  Age: 44 y.o.  Sex: male            Indications:  Screening    Preoperative Evaluation: The patient was evaluated prior to the procedure in the GI lab admission area, the patient ASA was recorded .  Consent was obtained from the patient with the risk of perforation bleeding and aspiration.    Anesthesia: MARTIR-per anesthesia    Complications: None; patient tolerated the procedure well.    EBL -insignificant      Procedure: The patient was sedated in the left lateral decubitus position.  Scope was advanced from the rectum to the cecum.  Per gastroenterology society guideline recommendations, right side of the colon was examined twice. The scope was withdrawn to the rectum, retroflexed view was performed.  The rectal exam was normal.  Preparation was adequate. Remsen score of 9 .    Findings:   1 polyp in the sigmoid colon, 3 mm in size, removed via jumbo biopsy forceps.  Otherwise unremarkable colon.  Internal hemorrhoids    Postoperative Diagnosis:   1 polyp  Hemorrhoids    Recommendations:   Repeat colonoscopy in 5 years.  Await for biopsy results, you will receive a pathology letter within 2 weeks.     Signed By:  Adrián Brandt MD     September 6, 2024

## 2024-09-06 NOTE — H&P
Samuel Hernandez is 44 y.o. y/o male here for screening colonoscopy.    No immediate (parents/siblings) FH of colon cancer, no acute symptoms.     History reviewed. No pertinent past medical history.  History reviewed. No pertinent surgical history.  Family History   Problem Relation Age of Onset    Hypertension Mother     Macular Degen Mother     No Known Problems Father      Social History     Tobacco Use    Smoking status: Former     Types: Cigarettes    Smokeless tobacco: Never   Vaping Use    Vaping status: Every Day    Substances: Nicotine    Devices: Disposable   Substance Use Topics    Alcohol use: Never    Drug use: Not Currently     Comment: CBD     No Known Allergies  Current Outpatient Medications   Medication Instructions    ADZENYS XR-ODT 6.3 MG TBED TAKE 1 TABLET IN THE MORNING ORALLY ONCE A DAY 30 DAYS    amphetamine-dextroamphetamine (ADDERALL XR) 10 MG extended release capsule 10 mg    amphetamine-dextroamphetamine (ADDERALL) 10 MG tablet 10 mg, Oral    sildenafil (VIAGRA) 50 mg, Oral, PRN     Review of Systems    ROS:    A complete 11 system ROS was performed and was negative aside from the pertinent negative and positives noted above.     PE:   Vitals:    09/06/24 0754   BP: 130/81   Pulse: 91   Resp: 16   Temp: 97.7 °F (36.5 °C)   SpO2: 97%      General:  The patient appears well-nourished, and is in no acute distress.    Skin:  no rash, ulcers. No Bleeding or signs of infection.  HEENT:  Normocephalic, atraumatic. No sclerae icterus.   Neck:  No pain on palpation or mobilization of the neck.  Respiratory: Respiratory effort is normal. Expansion maintained bilaterally and symmetrically. Normal breath sounds and clear to auscultation bilaterally without wheezes, rales, or rhonchi.    Cardiovascular:  Regular rate and rhythm.     Abdomen:  Soft, non tender to palpation. No distention. Normoactive bowel sounds present.    Extremities: No edema bilaterally. No erythema  Neurologic:  Alert and  oriented x3.  No sensory deficits. No asterixis  Psychiatric: Appropriate mood and affect.  Musculoskeletal: Strength and tone are symmetrical and maintained.    Adrián Brandt MD  [facility]

## 2024-09-09 ENCOUNTER — TELEPHONE (OUTPATIENT)
Dept: GASTROENTEROLOGY | Age: 44
End: 2024-09-09

## 2025-01-03 ENCOUNTER — TELEPHONE (OUTPATIENT)
Dept: FAMILY MEDICINE CLINIC | Facility: CLINIC | Age: 45
End: 2025-01-03

## 2025-01-03 DIAGNOSIS — Z30.09 VASECTOMY EVALUATION: Primary | ICD-10-CM

## 2025-01-03 NOTE — TELEPHONE ENCOUNTER
Patient called and wants to know if he needs a referral to a urologist for him to have a vasectomy or does he need to come into the office. Please confirm.

## 2025-01-09 ENCOUNTER — OFFICE VISIT (OUTPATIENT)
Dept: FAMILY MEDICINE CLINIC | Facility: CLINIC | Age: 45
End: 2025-01-09
Payer: COMMERCIAL

## 2025-01-09 VITALS
HEART RATE: 80 BPM | BODY MASS INDEX: 25.86 KG/M2 | WEIGHT: 208 LBS | TEMPERATURE: 97.7 F | SYSTOLIC BLOOD PRESSURE: 136 MMHG | OXYGEN SATURATION: 97 % | HEIGHT: 75 IN | DIASTOLIC BLOOD PRESSURE: 102 MMHG

## 2025-01-09 DIAGNOSIS — I10 ESSENTIAL HYPERTENSION: ICD-10-CM

## 2025-01-09 DIAGNOSIS — M54.31 SCIATICA OF RIGHT SIDE: Primary | ICD-10-CM

## 2025-01-09 PROCEDURE — 99214 OFFICE O/P EST MOD 30 MIN: CPT | Performed by: FAMILY MEDICINE

## 2025-01-09 PROCEDURE — 3080F DIAST BP >= 90 MM HG: CPT | Performed by: FAMILY MEDICINE

## 2025-01-09 PROCEDURE — 3075F SYST BP GE 130 - 139MM HG: CPT | Performed by: FAMILY MEDICINE

## 2025-01-09 RX ORDER — MELOXICAM 15 MG/1
15 TABLET ORAL DAILY
Qty: 30 TABLET | Refills: 0 | Status: SHIPPED | OUTPATIENT
Start: 2025-01-09

## 2025-01-09 RX ORDER — PREDNISONE 20 MG/1
20 TABLET ORAL 2 TIMES DAILY
COMMUNITY
Start: 2025-01-08

## 2025-01-09 RX ORDER — METHOCARBAMOL 750 MG/1
750 TABLET, FILM COATED ORAL 3 TIMES DAILY
COMMUNITY
Start: 2025-01-07

## 2025-01-09 SDOH — ECONOMIC STABILITY: FOOD INSECURITY: WITHIN THE PAST 12 MONTHS, YOU WORRIED THAT YOUR FOOD WOULD RUN OUT BEFORE YOU GOT MONEY TO BUY MORE.: NEVER TRUE

## 2025-01-09 SDOH — ECONOMIC STABILITY: FOOD INSECURITY: WITHIN THE PAST 12 MONTHS, THE FOOD YOU BOUGHT JUST DIDN'T LAST AND YOU DIDN'T HAVE MONEY TO GET MORE.: NEVER TRUE

## 2025-01-09 ASSESSMENT — PATIENT HEALTH QUESTIONNAIRE - PHQ9
2. FEELING DOWN, DEPRESSED OR HOPELESS: NOT AT ALL
SUM OF ALL RESPONSES TO PHQ QUESTIONS 1-9: 0
SUM OF ALL RESPONSES TO PHQ QUESTIONS 1-9: 0
1. LITTLE INTEREST OR PLEASURE IN DOING THINGS: NOT AT ALL
SUM OF ALL RESPONSES TO PHQ QUESTIONS 1-9: 0
SUM OF ALL RESPONSES TO PHQ9 QUESTIONS 1 & 2: 0
SUM OF ALL RESPONSES TO PHQ QUESTIONS 1-9: 0

## 2025-01-09 ASSESSMENT — ENCOUNTER SYMPTOMS: BACK PAIN: 1

## 2025-01-09 NOTE — PROGRESS NOTES
Samuel Hernandez (: 1980) is a 44 y.o. male, established patient, here for evaluation of the following chief complaint(s):  Back Pain (Saturday night sneezed while in bed and pt had back pain afdterward/)       ASSESSMENT/PLAN:  1. Sciatica of right side  -     XR LUMBAR SPINE (2-3 VIEWS); Future    Patient is a 44-year-old male with new onset lumbar back pain, which rating down his right leg, had similar symptoms about a month ago, but symptoms recurred when he sat up in bed and sneezed roughly 4 to 5 days ago, was seen in urgent care 2 days ago and given prednisone course and Robaxin, symptoms are not improving, patient is on day 2 of prednisone, encourage patient to stay the course, will also prescribe meloxicam to use as needed daily for pain, do not combine with ibuprofen or other NSAIDs,  Ordered x-ray to evaluate his lumbar spine if symptoms do not improve in the next 1 to 2 weeks.  Patient agreeable to plan,  Patient to let us know if symptoms have not improved in the next few weeks and to go for x-ray  SUBJECTIVE/OBJECTIVE:  Back Pain      See above    Physical Exam  Constitutional:       General: He is not in acute distress.     Appearance: He is not ill-appearing, toxic-appearing or diaphoretic.   HENT:      Head: Normocephalic and atraumatic.      Right Ear: External ear normal.      Left Ear: External ear normal.      Nose: Nose normal. No congestion or rhinorrhea.      Mouth/Throat:      Mouth: Mucous membranes are moist.   Eyes:      Extraocular Movements: Extraocular movements intact.      Pupils: Pupils are equal, round, and reactive to light.   Cardiovascular:      Rate and Rhythm: Normal rate.      Pulses: Normal pulses.   Pulmonary:      Effort: Pulmonary effort is normal. No respiratory distress.   Abdominal:      General: Abdomen is flat.      Palpations: Abdomen is soft.   Musculoskeletal:      Cervical back: Normal range of motion and neck supple. No rigidity.   Neurological:

## 2025-01-13 ENCOUNTER — HOSPITAL ENCOUNTER (OUTPATIENT)
Dept: GENERAL RADIOLOGY | Age: 45
Discharge: HOME | End: 2025-01-16
Payer: COMMERCIAL

## 2025-01-13 DIAGNOSIS — M54.31 SCIATICA OF RIGHT SIDE: ICD-10-CM

## 2025-01-13 PROCEDURE — 72100 X-RAY EXAM L-S SPINE 2/3 VWS: CPT

## 2025-01-13 NOTE — RESULT ENCOUNTER NOTE
Please call patient and see if he is feeling better, let him know his x-ray was overall okay, showed some mild changes in the degenerative disc, and some arthritis, but if his symptoms are no better, we can order an MRI.

## 2025-01-14 ENCOUNTER — TELEPHONE (OUTPATIENT)
Dept: FAMILY MEDICINE CLINIC | Facility: CLINIC | Age: 45
End: 2025-01-14

## 2025-01-14 ENCOUNTER — PATIENT MESSAGE (OUTPATIENT)
Dept: FAMILY MEDICINE CLINIC | Facility: CLINIC | Age: 45
End: 2025-01-14

## 2025-01-14 DIAGNOSIS — M54.16 LUMBAR RADICULOPATHY: Primary | ICD-10-CM

## 2025-01-14 NOTE — TELEPHONE ENCOUNTER
Patient called and stated he was in last week with back pain and it isn't any better. Patient had an xray yesterday and wants to know if he needs to come in to get the report or can someone call him with the results.   Patient's number is [phone]  Patient's  [birth year]

## 2025-01-17 ENCOUNTER — TELEPHONE (OUTPATIENT)
Dept: FAMILY MEDICINE CLINIC | Facility: CLINIC | Age: 45
End: 2025-01-17

## 2025-01-17 DIAGNOSIS — M54.31 SCIATICA OF RIGHT SIDE: Primary | ICD-10-CM

## 2025-01-24 NOTE — TELEPHONE ENCOUNTER
Let him know the mri was ordered as we had previously discussed/informed him.    Call  to schedule radiology tests

## 2025-01-27 ENCOUNTER — HOSPITAL ENCOUNTER (OUTPATIENT)
Dept: MRI IMAGING | Age: 45
Discharge: HOME | End: 2025-01-30
Attending: FAMILY MEDICINE
Payer: COMMERCIAL

## 2025-01-27 DIAGNOSIS — M54.31 SCIATICA OF RIGHT SIDE: ICD-10-CM

## 2025-01-27 PROCEDURE — 72148 MRI LUMBAR SPINE W/O DYE: CPT

## 2025-01-29 DIAGNOSIS — M54.9 BACK PAIN, UNSPECIFIED BACK LOCATION, UNSPECIFIED BACK PAIN LATERALITY, UNSPECIFIED CHRONICITY: Primary | ICD-10-CM

## 2025-01-30 ENCOUNTER — OFFICE VISIT (OUTPATIENT)
Dept: NEUROSURGERY | Age: 45
End: 2025-01-30
Payer: COMMERCIAL

## 2025-01-30 ENCOUNTER — HOSPITAL ENCOUNTER (OUTPATIENT)
Dept: GENERAL RADIOLOGY | Age: 45
Discharge: HOME | End: 2025-01-30
Payer: COMMERCIAL

## 2025-01-30 VITALS
TEMPERATURE: 98.8 F | HEIGHT: 75 IN | SYSTOLIC BLOOD PRESSURE: 119 MMHG | WEIGHT: 208 LBS | OXYGEN SATURATION: 98 % | DIASTOLIC BLOOD PRESSURE: 87 MMHG | HEART RATE: 93 BPM | BODY MASS INDEX: 25.86 KG/M2

## 2025-01-30 DIAGNOSIS — M54.9 BACK PAIN, UNSPECIFIED BACK LOCATION, UNSPECIFIED BACK PAIN LATERALITY, UNSPECIFIED CHRONICITY: ICD-10-CM

## 2025-01-30 DIAGNOSIS — M47.9 SPONDYLOSIS: ICD-10-CM

## 2025-01-30 DIAGNOSIS — Z86.39 HISTORY OF HYPERLIPIDEMIA: ICD-10-CM

## 2025-01-30 DIAGNOSIS — Z86.79 HISTORY OF HYPERTENSION: ICD-10-CM

## 2025-01-30 DIAGNOSIS — M54.16 LUMBAR RADICULOPATHY: Primary | ICD-10-CM

## 2025-01-30 DIAGNOSIS — Z87.81 HISTORY OF FOOT FRACTURE: ICD-10-CM

## 2025-01-30 PROCEDURE — 99204 OFFICE O/P NEW MOD 45 MIN: CPT | Performed by: STUDENT IN AN ORGANIZED HEALTH CARE EDUCATION/TRAINING PROGRAM

## 2025-01-30 PROCEDURE — 72120 X-RAY BEND ONLY L-S SPINE: CPT

## 2025-01-30 RX ORDER — GABAPENTIN 100 MG/1
300 CAPSULE ORAL 3 TIMES DAILY
Qty: 90 CAPSULE | Refills: 5 | Status: SHIPPED | OUTPATIENT
Start: 2025-01-30 | End: 2025-07-29

## 2025-01-30 NOTE — PROGRESS NOTES
[facility]      Neurosurgery History and Physical Clinic Note         Patient Name: Samuel Hernandez    Medical Record Number: [id number]    YOB: 1980    Date of Visit: 02/01/25         Visit Type: New Patient         CHIEF COMPLAINT:    Chief Complaint   Patient presents with    New Patient            HISTORY OF PRESENT ILLNESS:    I had the pleasure of meeting Samuel Hernandez in the neurosurgical clinic today. Samuel Hernandez is a pleasant 44 y.o. year young male who presented with complaints of lower back pain and right leg pain.  Patient reported since the beginning of this year he has been having pain, burning sensation from his lower back into his right foot.  Pain radiates down his posterior lateral thigh and down to the top of his foot.  Patient was given some prednisone, meloxicam and gabapentin.  So far he feels the gabapentin helped the most with his pain.  The pain is aggravated with walking, standing, sitting, lying down, and activities in general.  He has tried some hot and cold application.  Patient denies any weakness or any signs of foot drop.  Patient denies any bowel or bladder incontinence.,  No saddle anesthesia.        Red Flag Signs Acute  (Days) Subacute  (Weeks) Chronic  (Months) Absent   Incontinence  []  []  []  [x]   Severe Weakness  []  []  []  [x]   Severe Sensory Loss  []  []  []  [x]   Gait Disturbance  []  []  []  [x]     Blood Thinner: None      Medical History    History reviewed. No pertinent past medical history.         Surgical History    Past Surgical History:   Procedure Laterality Date    COLONOSCOPY N/A 9/6/2024    COLONOSCOPY POLYPECTOMY SNARE/BIOPSY performed by Adrián Brandt MD at [facility] ENDOSCOPY            Current Medications    Current Outpatient Medications   Medication Sig Dispense Refill    gabapentin (NEURONTIN) 100 MG capsule Take 3 capsules by mouth 3 times daily for 180 days. Intended supply: 30 days 90

## 2025-02-05 RX ORDER — MELOXICAM 15 MG/1
15 TABLET ORAL DAILY
Qty: 30 TABLET | Refills: 0 | OUTPATIENT
Start: 2025-02-05

## 2025-02-12 ENCOUNTER — HOSPITAL ENCOUNTER (OUTPATIENT)
Dept: PHYSICAL THERAPY | Age: 45
Setting detail: RECURRING SERIES
Discharge: HOME | End: 2025-02-15
Attending: STUDENT IN AN ORGANIZED HEALTH CARE EDUCATION/TRAINING PROGRAM
Payer: COMMERCIAL

## 2025-02-12 DIAGNOSIS — M54.59 OTHER LOW BACK PAIN: ICD-10-CM

## 2025-02-12 DIAGNOSIS — M62.81 MUSCLE WEAKNESS (GENERALIZED): Primary | ICD-10-CM

## 2025-02-12 PROCEDURE — 97161 PT EVAL LOW COMPLEX 20 MIN: CPT

## 2025-02-12 PROCEDURE — 97110 THERAPEUTIC EXERCISES: CPT

## 2025-02-12 NOTE — THERAPY EVALUATION
with ADL and occupational task performance.          Outcome Measure:   Modified Oswestry Low Back Pain Questionnaire:  Score:  Initial: 20/50    Interpretation of Score: Each section is scored on a 0-5 scale, 5 representing the greatest disability.  The scores of each section are added together for a total score of 50.      MEDICAL NECESSITY:  Skilled intervention continues to be required due to above deficits affecting participation in basic ADLs and overall functional tolerance.     REASON FOR SERVICES/ OTHER COMMENTS:  Patient continues to require skilled intervention due to impairments affecting functional mobility.    Total Duration:  Time In: 0800  Time Out: 0901     Regarding Samuel Hernandez's therapy, I certify that the treatment plan above will be carried out by a therapist or under their direction.    Thank you for this referral,     Cathy Alberts, PT, DPT     Referring Physician Signature: Nikita Cuellar,*          Post Session Pain  Charge Capture  PT Visit Info  MD Guidelines  MyChart  Events  Attendance Report

## 2025-02-12 NOTE — PROGRESS NOTES
Samuel Hernandez  : 1980  Primary: [employer] [facility] [employer] ([facility])  Secondary:  [facility] [place] [address]  [place] [address]  Phone: [phone]  Fax: [phone] Plan Frequency: 1x/week until end POC    Plan of Care/Certification Expiration Date: 25            Plan of Care/Certification Expiration Date:  Plan of Care/Certification Expiration Date: 25    Frequency/Duration:   Plan Frequency: 1x/week until end POC      Time In/Out:   Time In: 0800  Time Out: 0901      PT Visit Info:    Plan Frequency: 1x/week until end POC  Total # of Visits to Date: 1  Progress Note Counter: 1      Visit Count:  1    OUTPATIENT PHYSICAL THERAPY:   Treatment Note 2025       Charge Capture        Episode  (lumbar radiculopathy into RLE)               Treatment Diagnosis:    Other low back pain  Muscle weakness (generalized)    Medical/Referring Diagnosis:   History of hypertension  History of hyperlipidemia  Radiculopathy, lumbar region  Spondylosis, unspecified   Referring Physician: Nikita Cuellar MD MD Orders: PT Eval and Treat   Return MD Appt:  25 with Dr. Cuellar   Date of Onset: Onset Date: 25    Allergies: Patient has no known allergies.    Restrictions/Precautions: None    Interventions Planned (Treatment may consist of any combination of the following): See Assessment Note      Subjective Comments: See Evaluation Note from today.    Initial Pain Level:      (1-2)/10   Post Session Pain Level:        (1-2)/10     Medications Last Reviewed: 2025    Updated Objective Findings: See Evaluation Note from today    Treatment     THERAPEUTIC ACTIVITY: (5 minutes): Therapeutic activities per grid below to improve mobility, strength and balance. Required visual, verbal and tactile cues to promote static and dynamic balance in sitting/standing, as well as coordination of bilateral extremities.     PT evaluation, education on findings,

## 2025-02-17 ENCOUNTER — HOSPITAL ENCOUNTER (OUTPATIENT)
Dept: PHYSICAL THERAPY | Age: 45
Setting detail: RECURRING SERIES
End: 2025-02-17
Attending: STUDENT IN AN ORGANIZED HEALTH CARE EDUCATION/TRAINING PROGRAM
Payer: COMMERCIAL

## 2025-02-18 ENCOUNTER — HOSPITAL ENCOUNTER (OUTPATIENT)
Dept: PHYSICAL THERAPY | Age: 45
Setting detail: RECURRING SERIES
Discharge: HOME | End: 2025-02-21
Attending: STUDENT IN AN ORGANIZED HEALTH CARE EDUCATION/TRAINING PROGRAM
Payer: COMMERCIAL

## 2025-02-18 PROCEDURE — 97110 THERAPEUTIC EXERCISES: CPT

## 2025-02-18 NOTE — PROGRESS NOTES
Samuel Hernandez  : 1980  Primary: [employer] [facility] [employer] [facility]  Secondary:  [facility] [place] [address]  [place] [address]  Phone: [phone]  Fax: [phone] Plan Frequency: 1x/week until end POC    Plan of Care/Certification Expiration Date: 25            Plan of Care/Certification Expiration Date:  Plan of Care/Certification Expiration Date: 25    Frequency/Duration:   Plan Frequency: 1x/week until end POC      Time In/Out:   Time In: 1604  Time Out: 1645      PT Visit Info:    Plan Frequency: 1x/week until end POC  Total # of Visits to Date: 2  Progress Note Counter: 2      Visit Count:  2    OUTPATIENT PHYSICAL THERAPY:   Treatment Note 2025       Charge Capture        Episode  (lumbar radiculopathy into RLE)               Treatment Diagnosis:    Other low back pain  Muscle weakness (generalized)    Medical/Referring Diagnosis:   History of hypertension  History of hyperlipidemia  Radiculopathy, lumbar region  Spondylosis, unspecified   Referring Physician: Nikita Cuellar MD MD Orders: PT Eval and Treat   Return MD Appt:  25 with Dr. Cuellar   Date of Onset: Onset Date: 25    Allergies: Patient has no known allergies.    Restrictions/Precautions: None    Interventions Planned (Treatment may consist of any combination of the following): See Assessment Note      Subjective Comments: overall he has been feeling a lot better. He relays continuation of exercises from last PT visit but has not yet incorporated LE strengthening with weighted machines at the gym or adding weights to his squats.    Initial Pain Level:     0/10   Post Session Pain Level:       0/10     Medications Last Reviewed: 2025    Updated Objective Findings: None Today    Treatment     THERAPEUTIC EXERCISE: (38 minutes): Exercises per grid below to improve mobility, strength, and balance. Required visual, verbal and tactile cues to promote proper

## 2025-02-19 ASSESSMENT — PAIN SCALES - GENERAL: PAINLEVEL_OUTOF10: 0

## 2025-02-27 ENCOUNTER — HOSPITAL ENCOUNTER (OUTPATIENT)
Dept: PHYSICAL THERAPY | Age: 45
Setting detail: RECURRING SERIES
End: 2025-02-27
Attending: STUDENT IN AN ORGANIZED HEALTH CARE EDUCATION/TRAINING PROGRAM
Payer: COMMERCIAL

## 2025-02-27 NOTE — PROGRESS NOTES
Samuel Hernandez  : 1980  Primary: [employer] [facility]  Secondary:  [facility] [place] @ [place] [address]   [facility] [address]  Phone: [phone]  Fax: [phone] Plan Frequency: 1x/week until end POC    Plan of Care/Certification Expiration Date: 25      PT Visit Info:    Total # of Visits to Date: 2  Progress Note Counter: 2  No Show: 1      OUTPATIENT PHYSICAL THERAPY 2025     Appt Desk   Episode   MyChart      Mr. Hernandez was a No Show for today's appointment. An attempt to contact Mr. Hernandez was made and  pt noting he forgot about appt. Added pt to waitlist .    No Show Number: 1    Cathy Alberts, PT, DPT    Future Appointments   Date Time Provider Department Center   4/10/2025  8:30 AM [facility] LAB [facility] DEP   2025  1:00 PM Ammy Moise PA [facility] DEP   2025  3:45 PM Nikita Cuellar MD [facility] AMB

## 2025-03-05 ENCOUNTER — HOSPITAL ENCOUNTER (OUTPATIENT)
Dept: PHYSICAL THERAPY | Age: 45
Setting detail: RECURRING SERIES
Discharge: HOME | End: 2025-03-07
Attending: STUDENT IN AN ORGANIZED HEALTH CARE EDUCATION/TRAINING PROGRAM
Payer: COMMERCIAL

## 2025-03-05 PROCEDURE — 97110 THERAPEUTIC EXERCISES: CPT

## 2025-03-05 NOTE — PROGRESS NOTES
clinic regarding need for continuation of care.  Communication/Consultation: None today  Equipment provided: HEP  Recommendations/Intent for next treatment session: hold PT    >Total Treatment Billable Duration: 45 minutes  Time In: 0906  Time Out: 0958     Cathy Alberts, PT, DPT    Charge Capture  [employer] [facility]  Appt Desk  Attendance Report      Future Appointments   Date Time Provider Department Center   4/10/2025  8:30 AM [facility] LAB [facility] DEP   4/17/2025  1:00 PM Ammy Moise PA [facility] DEP   5/1/2025  3:45 PM Nikita Cuellar MD [facility] AMB

## 2025-03-18 ENCOUNTER — APPOINTMENT (OUTPATIENT)
Dept: PHYSICAL THERAPY | Age: 45
End: 2025-03-18
Attending: STUDENT IN AN ORGANIZED HEALTH CARE EDUCATION/TRAINING PROGRAM
Payer: COMMERCIAL

## 2025-04-10 ENCOUNTER — LAB (OUTPATIENT)
Dept: FAMILY MEDICINE CLINIC | Facility: CLINIC | Age: 45
End: 2025-04-10

## 2025-04-10 DIAGNOSIS — Z00.00 BLOOD TESTS FOR ROUTINE GENERAL PHYSICAL EXAMINATION: ICD-10-CM

## 2025-04-10 LAB
ALBUMIN SERPL-MCNC: 4.2 G/DL (ref 3.5–5)
ALBUMIN/GLOB SERPL: 1.5 (ref 1–1.9)
ALP SERPL-CCNC: 49 U/L (ref 40–129)
ALT SERPL-CCNC: 37 U/L (ref 8–55)
ANION GAP SERPL CALC-SCNC: 11 MMOL/L (ref 7–16)
AST SERPL-CCNC: 28 U/L (ref 15–37)
BASOPHILS # BLD: 0.07 K/UL (ref 0–0.2)
BASOPHILS NFR BLD: 1.2 % (ref 0–2)
BILIRUB SERPL-MCNC: 0.5 MG/DL (ref 0–1.2)
BUN SERPL-MCNC: 12 MG/DL (ref 6–23)
CALCIUM SERPL-MCNC: 9.5 MG/DL (ref 8.8–10.2)
CHLORIDE SERPL-SCNC: 108 MMOL/L (ref 98–107)
CHOLEST SERPL-MCNC: 182 MG/DL (ref 0–200)
CO2 SERPL-SCNC: 22 MMOL/L (ref 20–29)
CREAT SERPL-MCNC: 1.26 MG/DL (ref 0.8–1.3)
DIFFERENTIAL METHOD BLD: NORMAL
EOSINOPHIL # BLD: 0.13 K/UL (ref 0–0.8)
EOSINOPHIL NFR BLD: 2.2 % (ref 0.5–7.8)
ERYTHROCYTE [DISTWIDTH] IN BLOOD BY AUTOMATED COUNT: 14.1 % (ref 11.9–14.6)
GLOBULIN SER CALC-MCNC: 2.8 G/DL (ref 2.3–3.5)
GLUCOSE SERPL-MCNC: 97 MG/DL (ref 70–99)
HCT VFR BLD AUTO: 43.7 % (ref 41.1–50.3)
HDLC SERPL-MCNC: 57 MG/DL (ref 40–60)
HDLC SERPL: 3.2 (ref 0–5)
HGB BLD-MCNC: 15.1 G/DL (ref 13.6–17.2)
IMM GRANULOCYTES # BLD AUTO: 0.01 K/UL (ref 0–0.5)
IMM GRANULOCYTES NFR BLD AUTO: 0.2 % (ref 0–5)
LDLC SERPL CALC-MCNC: 113 MG/DL (ref 0–100)
LYMPHOCYTES # BLD: 1.43 K/UL (ref 0.5–4.6)
LYMPHOCYTES NFR BLD: 24.6 % (ref 13–44)
MCH RBC QN AUTO: 29.1 PG (ref 26.1–32.9)
MCHC RBC AUTO-ENTMCNC: 34.6 G/DL (ref 31.4–35)
MCV RBC AUTO: 84.2 FL (ref 82–102)
MONOCYTES # BLD: 0.51 K/UL (ref 0.1–1.3)
MONOCYTES NFR BLD: 8.8 % (ref 4–12)
NEUTS SEG # BLD: 3.67 K/UL (ref 1.7–8.2)
NEUTS SEG NFR BLD: 63 % (ref 43–78)
NRBC # BLD: 0 K/UL (ref 0–0.2)
PLATELET # BLD AUTO: 347 K/UL (ref 150–450)
PMV BLD AUTO: 9.8 FL (ref 9.4–12.3)
POTASSIUM SERPL-SCNC: 4.4 MMOL/L (ref 3.5–5.1)
PROT SERPL-MCNC: 7 G/DL (ref 6.3–8.2)
RBC # BLD AUTO: 5.19 M/UL (ref 4.23–5.6)
SODIUM SERPL-SCNC: 141 MMOL/L (ref 136–145)
TRIGL SERPL-MCNC: 61 MG/DL (ref 0–150)
TSH, 3RD GENERATION: 2.41 UIU/ML (ref 0.27–4.2)
VLDLC SERPL CALC-MCNC: 12 MG/DL (ref 6–23)
WBC # BLD AUTO: 5.8 K/UL (ref 4.3–11.1)

## 2025-04-11 ENCOUNTER — RESULTS FOLLOW-UP (OUTPATIENT)
Dept: FAMILY MEDICINE CLINIC | Facility: CLINIC | Age: 45
End: 2025-04-11

## 2025-04-16 PROBLEM — F90.9 ADHD (ATTENTION DEFICIT HYPERACTIVITY DISORDER): Status: ACTIVE | Noted: 2025-04-16

## 2025-04-16 PROBLEM — Z86.79 HISTORY OF HYPERTENSION: Status: RESOLVED | Noted: 2022-08-03 | Resolved: 2025-04-16

## 2025-04-16 PROBLEM — Z87.81 HISTORY OF FOOT FRACTURE: Status: RESOLVED | Noted: 2023-01-03 | Resolved: 2025-04-16

## 2025-04-16 PROBLEM — Z86.39 HISTORY OF HYPERLIPIDEMIA: Status: RESOLVED | Noted: 2022-08-03 | Resolved: 2025-04-16

## 2025-04-17 ENCOUNTER — OFFICE VISIT (OUTPATIENT)
Dept: FAMILY MEDICINE CLINIC | Facility: CLINIC | Age: 45
End: 2025-04-17
Payer: COMMERCIAL

## 2025-04-17 VITALS
DIASTOLIC BLOOD PRESSURE: 80 MMHG | OXYGEN SATURATION: 100 % | HEART RATE: 75 BPM | HEIGHT: 75 IN | BODY MASS INDEX: 26.53 KG/M2 | SYSTOLIC BLOOD PRESSURE: 128 MMHG | WEIGHT: 213.4 LBS | TEMPERATURE: 97.8 F

## 2025-04-17 DIAGNOSIS — N52.9 ERECTILE DYSFUNCTION, UNSPECIFIED ERECTILE DYSFUNCTION TYPE: ICD-10-CM

## 2025-04-17 DIAGNOSIS — M47.9 SPONDYLOSIS: ICD-10-CM

## 2025-04-17 DIAGNOSIS — Z00.00 PHYSICAL EXAM, ANNUAL: Primary | ICD-10-CM

## 2025-04-17 PROCEDURE — 99396 PREV VISIT EST AGE 40-64: CPT | Performed by: PHYSICIAN ASSISTANT

## 2025-04-17 RX ORDER — SILDENAFIL 50 MG/1
50 TABLET, FILM COATED ORAL PRN
Qty: 30 TABLET | Refills: 3 | Status: SHIPPED | OUTPATIENT
Start: 2025-04-17

## 2025-04-17 SDOH — ECONOMIC STABILITY: FOOD INSECURITY: WITHIN THE PAST 12 MONTHS, YOU WORRIED THAT YOUR FOOD WOULD RUN OUT BEFORE YOU GOT MONEY TO BUY MORE.: NEVER TRUE

## 2025-04-17 SDOH — ECONOMIC STABILITY: FOOD INSECURITY: WITHIN THE PAST 12 MONTHS, THE FOOD YOU BOUGHT JUST DIDN'T LAST AND YOU DIDN'T HAVE MONEY TO GET MORE.: NEVER TRUE

## 2025-04-17 ASSESSMENT — ENCOUNTER SYMPTOMS
EYE DISCHARGE: 0
COUGH: 0
RHINORRHEA: 0
CONSTIPATION: 0
CHEST TIGHTNESS: 0
EYE ITCHING: 0
WHEEZING: 0
SORE THROAT: 0
EYE PAIN: 0
DIARRHEA: 0
ABDOMINAL PAIN: 0

## 2025-04-17 NOTE — ASSESSMENT & PLAN NOTE
Chronic, at goal (stable), continue current treatment plan    Orders:    sildenafil (VIAGRA) 50 MG tablet; Take 1 tablet by mouth as needed for Erectile Dysfunction

## 2025-04-17 NOTE — PROGRESS NOTES
1/30/25:  Seen by Neurosurgeon for lumbar radiculopathy, spondylosis. Started on Gabapentin for L5 radiculopathy and referred to PT.   May consider injection if conservative therapy fails after 3 mos.      Colonoscopy done 9/6/24.  Repeat 5 years.      Followed by [facility] for mgt of ADHD.  Prescribed Adderall.        At his visit On 1/9/25, the following was discussed w/ Dr. SILVA:     Patient is a 44-year-old male with new onset lumbar back pain, which rating down his right leg, had similar symptoms about a month ago, but symptoms recurred when he sat up in bed and sneezed roughly 4 to 5 days ago, was seen in urgent care 2 days ago and given prednisone course and Robaxin, symptoms are not improving, patient is on day 2 of prednisone, encourage patient to stay the course, will also prescribe meloxicam to use as needed daily for pain, do not combine with ibuprofen or other NSAIDs,  Ordered x-ray to evaluate his lumbar spine if symptoms do not improve in the next 1 to 2 weeks    At his visit On 4/11/24, the following was discussed:     Lipitor and Lisinopril both stopped by patient prior to visit.  Based on visit findings, these two meds are no longer needed.      At today's visit:     ***  
Conjunctivae normal.      Pupils: Pupils are equal, round, and reactive to light.   Cardiovascular:      Rate and Rhythm: Normal rate and regular rhythm.      Heart sounds: Normal heart sounds.   Pulmonary:      Effort: Pulmonary effort is normal.      Breath sounds: Normal breath sounds. No wheezing or rhonchi.   Abdominal:      General: Abdomen is flat.      Palpations: There is no mass.      Tenderness: There is no abdominal tenderness.      Hernia: No hernia is present.   Musculoskeletal:         General: No swelling or tenderness.      Cervical back: Normal range of motion and neck supple.   Skin:     General: Skin is warm and dry.   Neurological:      General: No focal deficit present.      Mental Status: He is alert and oriented to person, place, and time.   Psychiatric:         Mood and Affect: Mood normal.         Behavior: Behavior normal.         Thought Content: Thought content normal.         Judgment: Judgment normal.              1/9/2025     1:09 PM 4/11/2024     2:12 PM 4/5/2023     2:43 PM   PHQ-9    Little interest or pleasure in doing things 0 0 0   Feeling down, depressed, or hopeless 0 0 0   PHQ-2 Score 0 0 0   PHQ-9 Total Score 0 0 0          Orders Placed This Encounter    CBC with Auto Differential     Standing Status:   Future     Expected Date:   3/14/2026     Expiration Date:   10/17/2026    Comprehensive Metabolic Panel     Standing Status:   Future     Expected Date:   3/14/2026     Expiration Date:   10/17/2026    Lipid Panel     Standing Status:   Future     Expected Date:   3/14/2026     Expiration Date:   10/17/2026    TSH     Standing Status:   Future     Expected Date:   3/14/2026     Expiration Date:   10/17/2026    sildenafil (VIAGRA) 50 MG tablet     Sig: Take 1 tablet by mouth as needed for Erectile Dysfunction     Dispense:  30 tablet     Refill:  3        Part of this note was written by using a voice dictation software. The note has been proof read but may still contain

## 2025-04-17 NOTE — ASSESSMENT & PLAN NOTE
Seen by Neurosug on 1/30/25: For spondylosis and lumbar radiculopathy at L5 on the right side.  \"Patient is presenting with a right L5 radiculopathy.  We discussed that typically the first mode of treatment is trying something conservative first.  At this point I am going to start patient on some gabapentin 300 mg 3 times daily and refer patient to physical therapy.  If patient continues to have pain after 3 months we may consider referring patient for an injection.  If patient fails conservative management then we may have to consider possible surgery.  Also advised patient that if patient begins having uncontrolled pain or weakness to present to the ER.  If patient starts having weakness we may have to do surgery. \"    He was prescribed gabapentin 3 times daily and states that his back pain has gradually improved.  He is now only taking gabapentin 100 mg at bedtime.  I am asking him to try a trial of not taking this medication at all to see if it is still needed.  If he still feels like he is needing this 1 tablet at bedtime, I am glad to send in a refill for him.

## 2025-05-01 ENCOUNTER — OFFICE VISIT (OUTPATIENT)
Dept: NEUROSURGERY | Age: 45
End: 2025-05-01
Payer: COMMERCIAL

## 2025-05-01 VITALS
SYSTOLIC BLOOD PRESSURE: 125 MMHG | TEMPERATURE: 98.1 F | WEIGHT: 211 LBS | HEART RATE: 73 BPM | DIASTOLIC BLOOD PRESSURE: 77 MMHG | HEIGHT: 75 IN | BODY MASS INDEX: 26.24 KG/M2 | OXYGEN SATURATION: 98 %

## 2025-05-01 DIAGNOSIS — M54.16 LUMBAR RADICULOPATHY: Primary | ICD-10-CM

## 2025-05-01 DIAGNOSIS — M47.9 SPONDYLOSIS: ICD-10-CM

## 2025-05-01 PROCEDURE — 99214 OFFICE O/P EST MOD 30 MIN: CPT | Performed by: STUDENT IN AN ORGANIZED HEALTH CARE EDUCATION/TRAINING PROGRAM

## 2025-05-01 NOTE — PROGRESS NOTES
Relation Age of Onset    Hypertension Mother     Macular Degen Mother     No Known Problems Father             Social History    Social History     Socioeconomic History    Marital status:      Spouse name: Not on file    Number of children: Not on file    Years of education: Not on file    Highest education level: Not on file   Occupational History    Not on file   Tobacco Use    Smoking status: Former     Types: Cigarettes    Smokeless tobacco: Never   Vaping Use    Vaping status: Every Day    Substances: Nicotine    Devices: Disposable   Substance and Sexual Activity    Alcohol use: Never    Drug use: Not Currently     Comment: CBD    Sexual activity: Not on file   Other Topics Concern    Not on file   Social History Narrative    Not on file     Social Drivers of Health     Financial Resource Strain: Low Risk  (4/11/2024)    Overall Financial Resource Strain (CARDIA)     Difficulty of Paying Living Expenses: Not hard at all   Food Insecurity: No Food Insecurity (4/17/2025)    Hunger Vital Sign     Worried About Running Out of Food in the Last Year: Never true     Ran Out of Food in the Last Year: Never true   Transportation Needs: No Transportation Needs (4/17/2025)    PRAPARE - Transportation     Lack of Transportation (Medical): No     Lack of Transportation (Non-Medical): No   Physical Activity: Not on file   Stress: Not on file   Social Connections: Unknown (8/6/2021)    Received from [employer]    Social Connections     Frequency of Communication with Friends and Family: Not asked     Frequency of Social Gatherings with Friends and Family: Not asked   Intimate Partner Violence: Unknown (8/6/2021)    Received from [employer]    Intimate Partner Violence     Fear of Current or Ex-Partner: Not asked     Emotionally Abused: Not asked     Physically Abused: Not asked     Sexually Abused: Not asked   Housing Stability: Low Risk  (4/17/2025)    Housing Stability Vital Sign

## (undated) DEVICE — NEEDLE SYRINGE 18GA L1.5IN RED PLAS HUB S STL BLNT FILL W/O

## (undated) DEVICE — LUBE JELLY FOIL PACK 1.4 OZ: Brand: MEDLINE INDUSTRIES, INC.

## (undated) DEVICE — SINGLE PORT MANIFOLD: Brand: NEPTUNE 2

## (undated) DEVICE — CONNECTOR TBNG OD5-7MM O2 END DISP

## (undated) DEVICE — SYRINGE MEDICAL 3ML CLEAR PLASTIC STANDARD NON CONTROL LUERLOCK TIP DISPOSABLE

## (undated) DEVICE — FORCEPS BX L240CM JAW DIA2.8MM L CAP W/ NDL MIC MESH TOOTH

## (undated) DEVICE — SYRINGE MED 10ML LUERLOCK TIP W/O SFTY DISP

## (undated) DEVICE — AIRLIFE™ OXYGEN TUBING 7 FEET (2.1 M) CRUSH RESISTANT OXYGEN TUBING, VINYL TIPPED: Brand: AIRLIFE™

## (undated) DEVICE — KENDALL RADIOLUCENT FOAM MONITORING ELECTRODE RECTANGULAR SHAPE: Brand: KENDALL

## (undated) DEVICE — GAUZE,SPONGE,4"X4",12PLY,WOVEN,NS,LF: Brand: MEDLINE

## (undated) DEVICE — CONTAINER FORMALIN PREFILLED 10% NBF 60ML

## (undated) DEVICE — CANNULA NSL ORAL AD FOR CAPNOFLEX CO2 O2 AIRLFE

## (undated) DEVICE — ENDOSCOPIC KIT 1.1+ OP4 CA DE 2 GWN AAMI LEVEL 3